# Patient Record
Sex: MALE | Race: BLACK OR AFRICAN AMERICAN | Employment: FULL TIME | URBAN - METROPOLITAN AREA
[De-identification: names, ages, dates, MRNs, and addresses within clinical notes are randomized per-mention and may not be internally consistent; named-entity substitution may affect disease eponyms.]

---

## 2017-01-18 ENCOUNTER — APPOINTMENT (OUTPATIENT)
Dept: GENERAL RADIOLOGY | Age: 32
End: 2017-01-18
Attending: NURSE PRACTITIONER
Payer: MEDICAID

## 2017-01-18 ENCOUNTER — HOSPITAL ENCOUNTER (EMERGENCY)
Age: 32
Discharge: HOME | End: 2017-01-18
Attending: EMERGENCY MEDICINE
Payer: MEDICAID

## 2017-01-18 VITALS
HEART RATE: 69 BPM | HEIGHT: 68 IN | BODY MASS INDEX: 22.73 KG/M2 | WEIGHT: 150 LBS | DIASTOLIC BLOOD PRESSURE: 67 MMHG | OXYGEN SATURATION: 98 % | RESPIRATION RATE: 17 BRPM | TEMPERATURE: 98 F | SYSTOLIC BLOOD PRESSURE: 151 MMHG

## 2017-01-18 DIAGNOSIS — R07.9 CHEST PAIN, UNSPECIFIED TYPE: Primary | ICD-10-CM

## 2017-01-18 DIAGNOSIS — R05.9 COUGH: ICD-10-CM

## 2017-01-18 LAB
ALBUMIN SERPL BCP-MCNC: 4.6 G/DL (ref 3.5–5)
ALBUMIN/GLOB SERPL: 1.4 {RATIO} (ref 1.1–2.2)
ALP SERPL-CCNC: 62 U/L (ref 45–117)
ALT SERPL-CCNC: 39 U/L (ref 12–78)
ANION GAP BLD CALC-SCNC: 10 MMOL/L (ref 5–15)
AST SERPL W P-5'-P-CCNC: 19 U/L (ref 15–37)
BASOPHILS # BLD AUTO: 0 K/UL (ref 0–0.1)
BASOPHILS # BLD: 0 % (ref 0–1)
BILIRUB SERPL-MCNC: 0.8 MG/DL (ref 0.2–1)
BUN SERPL-MCNC: 10 MG/DL (ref 6–20)
BUN/CREAT SERPL: 11 (ref 12–20)
CALCIUM SERPL-MCNC: 9.4 MG/DL (ref 8.5–10.1)
CHLORIDE SERPL-SCNC: 105 MMOL/L (ref 97–108)
CO2 SERPL-SCNC: 26 MMOL/L (ref 21–32)
CREAT SERPL-MCNC: 0.91 MG/DL (ref 0.7–1.3)
D DIMER PPP FEU-MCNC: <0.17 MG/L FEU (ref 0–0.65)
DIFFERENTIAL METHOD BLD: ABNORMAL
EOSINOPHIL # BLD: 0.1 K/UL (ref 0–0.4)
EOSINOPHIL NFR BLD: 1 % (ref 0–7)
ERYTHROCYTE [DISTWIDTH] IN BLOOD BY AUTOMATED COUNT: 15.1 % (ref 11.5–14.5)
GLOBULIN SER CALC-MCNC: 3.3 G/DL (ref 2–4)
GLUCOSE SERPL-MCNC: 98 MG/DL (ref 65–100)
HCT VFR BLD AUTO: 42.1 % (ref 36.6–50.3)
HGB BLD-MCNC: 14.2 G/DL (ref 12.1–17)
LYMPHOCYTES # BLD AUTO: 29 % (ref 12–49)
LYMPHOCYTES # BLD: 1.6 K/UL (ref 0.8–3.5)
MCH RBC QN AUTO: 25 PG (ref 26–34)
MCHC RBC AUTO-ENTMCNC: 33.7 G/DL (ref 30–36.5)
MCV RBC AUTO: 74.3 FL (ref 80–99)
MONOCYTES # BLD: 0.4 K/UL (ref 0–1)
MONOCYTES NFR BLD AUTO: 8 % (ref 5–13)
NEUTS SEG # BLD: 3.5 K/UL (ref 1.8–8)
NEUTS SEG NFR BLD AUTO: 62 % (ref 32–75)
PLATELET # BLD AUTO: 173 K/UL (ref 150–400)
PLATELET COMMENTS,PCOM: ABNORMAL
POTASSIUM SERPL-SCNC: 3.6 MMOL/L (ref 3.5–5.1)
PROT SERPL-MCNC: 7.9 G/DL (ref 6.4–8.2)
RBC # BLD AUTO: 5.67 M/UL (ref 4.1–5.7)
RBC MORPH BLD: ABNORMAL
SODIUM SERPL-SCNC: 141 MMOL/L (ref 136–145)
TROPONIN I SERPL-MCNC: <0.04 NG/ML
WBC # BLD AUTO: 5.6 K/UL (ref 4.1–11.1)
WBC MORPH BLD: ABNORMAL

## 2017-01-18 PROCEDURE — 93005 ELECTROCARDIOGRAM TRACING: CPT

## 2017-01-18 PROCEDURE — 74011250637 HC RX REV CODE- 250/637: Performed by: NURSE PRACTITIONER

## 2017-01-18 PROCEDURE — 96374 THER/PROPH/DIAG INJ IV PUSH: CPT

## 2017-01-18 PROCEDURE — 74011250636 HC RX REV CODE- 250/636: Performed by: NURSE PRACTITIONER

## 2017-01-18 PROCEDURE — 36415 COLL VENOUS BLD VENIPUNCTURE: CPT | Performed by: EMERGENCY MEDICINE

## 2017-01-18 PROCEDURE — 84484 ASSAY OF TROPONIN QUANT: CPT | Performed by: NURSE PRACTITIONER

## 2017-01-18 PROCEDURE — 74011636637 HC RX REV CODE- 636/637: Performed by: NURSE PRACTITIONER

## 2017-01-18 PROCEDURE — 85379 FIBRIN DEGRADATION QUANT: CPT | Performed by: EMERGENCY MEDICINE

## 2017-01-18 PROCEDURE — 85025 COMPLETE CBC W/AUTO DIFF WBC: CPT | Performed by: EMERGENCY MEDICINE

## 2017-01-18 PROCEDURE — 94640 AIRWAY INHALATION TREATMENT: CPT

## 2017-01-18 PROCEDURE — 71020 XR CHEST PA LAT: CPT

## 2017-01-18 PROCEDURE — 77030029684 HC NEB SM VOL KT MONA -A

## 2017-01-18 PROCEDURE — 74011000250 HC RX REV CODE- 250: Performed by: NURSE PRACTITIONER

## 2017-01-18 PROCEDURE — 80053 COMPREHEN METABOLIC PANEL: CPT | Performed by: EMERGENCY MEDICINE

## 2017-01-18 PROCEDURE — 99285 EMERGENCY DEPT VISIT HI MDM: CPT

## 2017-01-18 RX ORDER — IPRATROPIUM BROMIDE AND ALBUTEROL SULFATE 2.5; .5 MG/3ML; MG/3ML
3 SOLUTION RESPIRATORY (INHALATION)
Status: COMPLETED | OUTPATIENT
Start: 2017-01-18 | End: 2017-01-18

## 2017-01-18 RX ORDER — ALBUTEROL SULFATE 90 UG/1
2 AEROSOL, METERED RESPIRATORY (INHALATION)
Qty: 1 INHALER | Refills: 0 | Status: SHIPPED | OUTPATIENT
Start: 2017-01-18 | End: 2017-11-09

## 2017-01-18 RX ORDER — PREDNISONE 20 MG/1
40 TABLET ORAL DAILY
Qty: 8 TAB | Refills: 0 | Status: SHIPPED | OUTPATIENT
Start: 2017-01-18 | End: 2017-01-22

## 2017-01-18 RX ORDER — KETOROLAC TROMETHAMINE 30 MG/ML
30 INJECTION, SOLUTION INTRAMUSCULAR; INTRAVENOUS
Status: COMPLETED | OUTPATIENT
Start: 2017-01-18 | End: 2017-01-18

## 2017-01-18 RX ORDER — PREDNISONE 20 MG/1
60 TABLET ORAL
Status: COMPLETED | OUTPATIENT
Start: 2017-01-18 | End: 2017-01-18

## 2017-01-18 RX ORDER — BENZONATATE 200 MG/1
200 CAPSULE ORAL
Qty: 15 CAP | Refills: 0 | Status: SHIPPED | OUTPATIENT
Start: 2017-01-18 | End: 2017-01-25

## 2017-01-18 RX ORDER — LORAZEPAM 1 MG/1
1 TABLET ORAL
Status: COMPLETED | OUTPATIENT
Start: 2017-01-18 | End: 2017-01-18

## 2017-01-18 RX ORDER — LEVETIRACETAM 500 MG/1
500 TABLET ORAL 2 TIMES DAILY
COMMUNITY
End: 2017-04-26 | Stop reason: SDUPTHER

## 2017-01-18 RX ADMIN — IPRATROPIUM BROMIDE AND ALBUTEROL SULFATE 3 ML: .5; 3 SOLUTION RESPIRATORY (INHALATION) at 12:56

## 2017-01-18 RX ADMIN — PREDNISONE 60 MG: 20 TABLET ORAL at 12:59

## 2017-01-18 RX ADMIN — KETOROLAC TROMETHAMINE 30 MG: 30 INJECTION, SOLUTION INTRAMUSCULAR at 13:56

## 2017-01-18 RX ADMIN — LORAZEPAM 1 MG: 1 TABLET ORAL at 13:56

## 2017-01-18 NOTE — DISCHARGE INSTRUCTIONS
Bronchitis: Care Instructions  Your Care Instructions    Bronchitis is inflammation of the bronchial tubes, which carry air to the lungs. The tubes swell and produce mucus, or phlegm. The mucus and inflamed bronchial tubes make you cough. You may have trouble breathing. Most cases of bronchitis are caused by viruses like those that cause colds. Antibiotics usually do not help and they may be harmful. Bronchitis usually develops rapidly and lasts about 2 to 3 weeks in otherwise healthy people. Follow-up care is a key part of your treatment and safety. Be sure to make and go to all appointments, and call your doctor if you are having problems. It's also a good idea to know your test results and keep a list of the medicines you take. How can you care for yourself at home? · Take all medicines exactly as prescribed. Call your doctor if you think you are having a problem with your medicine. · Get some extra rest.  · Take an over-the-counter pain medicine, such as acetaminophen (Tylenol), ibuprofen (Advil, Motrin), or naproxen (Aleve) to reduce fever and relieve body aches. Read and follow all instructions on the label. · Do not take two or more pain medicines at the same time unless the doctor told you to. Many pain medicines have acetaminophen, which is Tylenol. Too much acetaminophen (Tylenol) can be harmful. · Take an over-the-counter cough medicine that contains dextromethorphan to help quiet a dry, hacking cough so that you can sleep. Avoid cough medicines that have more than one active ingredient. Read and follow all instructions on the label. · Breathe moist air from a humidifier, hot shower, or sink filled with hot water. The heat and moisture will thin mucus so you can cough it out. · Do not smoke. Smoking can make bronchitis worse. If you need help quitting, talk to your doctor about stop-smoking programs and medicines. These can increase your chances of quitting for good.   When should you call for help? Call 911 anytime you think you may need emergency care. For example, call if:  · You have severe trouble breathing. Call your doctor now or seek immediate medical care if:  · You have new or worse trouble breathing. · You cough up dark brown or bloody mucus (sputum). · You have a new or higher fever. · You have a new rash. Watch closely for changes in your health, and be sure to contact your doctor if:  · You cough more deeply or more often, especially if you notice more mucus or a change in the color of your mucus. · You are not getting better as expected. Where can you learn more? Go to http://emilia-johnny.info/. Enter H333 in the search box to learn more about \"Bronchitis: Care Instructions. \"  Current as of: May 23, 2016  Content Version: 11.1  © 5305-5027 [employer]. Care instructions adapted under license by [employer] (which disclaims liability or warranty for this information). If you have questions about a medical condition or this instruction, always ask your healthcare professional. [place] [address] any warranty or liability for your use of this information. Cough: Care Instructions  Your Care Instructions  A cough is your body's response to something that bothers your throat or airways. Many things can cause a cough. You might cough because of a cold or the flu, bronchitis, or asthma. Smoking, postnasal drip, allergies, and stomach acid that backs up into your throat also can cause coughs. A cough is a symptom, not a disease. Most coughs stop when the cause, such as a cold, goes away. You can take a few steps at home to cough less and feel better. Follow-up care is a key part of your treatment and safety. Be sure to make and go to all appointments, and call your doctor if you are having problems. It's also a good idea to know your test results and keep a list of the medicines you take.   How can you care for yourself at home? · Drink lots of water and other fluids. This helps thin the mucus and soothes a dry or sore throat. Honey or lemon juice in hot water or tea may ease a dry cough. · Take cough medicine as directed by your doctor. · Prop up your head on pillows to help you breathe and ease a dry cough. · Try cough drops to soothe a dry or sore throat. Cough drops don't stop a cough. Medicine-flavored cough drops are no better than candy-flavored drops or hard candy. · Do not smoke. Avoid secondhand smoke. If you need help quitting, talk to your doctor about stop-smoking programs and medicines. These can increase your chances of quitting for good. When should you call for help? Call 911 anytime you think you may need emergency care. For example, call if:  · You have severe trouble breathing. Call your doctor now or seek immediate medical care if:  · You cough up blood. · You have new or worse trouble breathing. · You have a new or higher fever. · You have a new rash. Watch closely for changes in your health, and be sure to contact your doctor if:  · You cough more deeply or more often, especially if you notice more mucus or a change in the color of your mucus. · You have new symptoms, such as a sore throat, an earache, or sinus pain. · You do not get better as expected. Where can you learn more? Go to http://emilia-johnny.info/. Enter D279 in the search box to learn more about \"Cough: Care Instructions. \"  Current as of: May 27, 2016  Content Version: 11.1  © 0561-4814 [employer]. Care instructions adapted under license by [employer] (which disclaims liability or warranty for this information). If you have questions about a medical condition or this instruction, always ask your healthcare professional. [address] any warranty or liability for your use of this information.          Chest Pain: Care Instructions  Your Care Instructions  There are many things that can cause chest pain. Some are not serious and will get better on their own in a few days. But some kinds of chest pain need more testing and treatment. Your doctor may have recommended a follow-up visit in the next 8 to 12 hours. If you are not getting better, you may need more tests or treatment. Even though your doctor has released you, you still need to watch for any problems. The doctor carefully checked you, but sometimes problems can develop later. If you have new symptoms or if your symptoms do not get better, get medical care right away. If you have worse or different chest pain or pressure that lasts more than 5 minutes or you passed out (lost consciousness), call 911 or seek other emergency help right away. A medical visit is only one step in your treatment. Even if you feel better, you still need to do what your doctor recommends, such as going to all suggested follow-up appointments and taking medicines exactly as directed. This will help you recover and help prevent future problems. How can you care for yourself at home? · Rest until you feel better. · Take your medicine exactly as prescribed. Call your doctor if you think you are having a problem with your medicine. · Do not drive after taking a prescription pain medicine. When should you call for help? Call 911 if:  · You passed out (lost consciousness). · You have severe difficulty breathing. · You have symptoms of a heart attack. These may include:  ¨ Chest pain or pressure, or a strange feeling in your chest.  ¨ Sweating. ¨ Shortness of breath. ¨ Nausea or vomiting. ¨ Pain, pressure, or a strange feeling in your back, neck, jaw, or upper belly or in one or both shoulders or arms. ¨ Lightheadedness or sudden weakness. ¨ A fast or irregular heartbeat. After you call 911, the  may tell you to chew 1 adult-strength or 2 to 4 low-dose aspirin. Wait for an ambulance. Do not try to drive yourself.   Call your doctor today if:  · You have any trouble breathing. · Your chest pain gets worse. · You are dizzy or lightheaded, or you feel like you may faint. · You are not getting better as expected. · You are having new or different chest pain. Where can you learn more? Go to http://emilia-johnny.info/. Enter A120 in the search box to learn more about \"Chest Pain: Care Instructions. \"  Current as of: May 27, 2016  Content Version: 11.1  © 4629-1617 [employer]. Care instructions adapted under license by [employer] (which disclaims liability or warranty for this information). If you have questions about a medical condition or this instruction, always ask your healthcare professional. [address] any warranty or liability for your use of this information.

## 2017-01-18 NOTE — ED TRIAGE NOTES
Patient comes to the ER via EMS c/o sudden onset of constant L sided chest pain this morning, non radiating. Patient reports not feeling well for the last month with coughing and generalized weakness. 1 nitro given en route.

## 2017-01-18 NOTE — ED PROVIDER NOTES
HPI Comments: 28 y/o male PMHx seizures, elevated blood pressure presents to the ED via EMS with a chief complaint of chest pain. Pt states that he has been \"sick for awhile,\" noting congestion and cough x 1 month and feeling generally weak. He states onset of chest pain today around 0800 while driving to work. He states pain is to the L side of his chest. States it feels 'tight'. Rates current pain as 9/10. Was given nitro en route via EMS without significant improvement. States pain is non radiating. States he feels short of breath. States cough is non productive. Denies hemoptysis, leg swelling, fever, recent travel or trauma or surgery. Denies any recent seizure (statse last seizure months ago). States he was previously told he had high blood pressure but has never taken any medication for blood pressure. History reviewed. No pertinent surgical history. SocHx: 1ppd smoker. Occasional marijuana use. Denies other drug use. Denies alcohol use      Patient is a 27 y.o. male presenting with chest pain. Chest Pain (Angina)    Associated symptoms include cough and shortness of breath. Pertinent negatives include no abdominal pain, no fever and no vomiting. Past Medical History:   Diagnosis Date    Neurological disorder      seizures    Seizures ([facility] [address]        History reviewed. No pertinent past surgical history. History reviewed. No pertinent family history. Social History     Social History    Marital status: SINGLE     Spouse name: N/A    Number of children: N/A    Years of education: N/A     Occupational History    Not on file.      Social History Main Topics    Smoking status: Current Some Day Smoker     Packs/day: 1.00    Smokeless tobacco: Not on file    Alcohol use No    Drug use: No    Sexual activity: No     Other Topics Concern    Not on file     Social History Narrative    ** Merged History Encounter **              ALLERGIES: Aspirin and Aspirin    Review of Systems Constitutional: Negative for fever. Respiratory: Positive for cough and shortness of breath. Cardiovascular: Positive for chest pain. Negative for leg swelling. Gastrointestinal: Negative for abdominal pain and vomiting. Allergic/Immunologic: Negative for immunocompromised state. 10 systems reviewed and are negative except as indicated in the HPI    Vitals:    01/18/17 1228   BP: 125/84   Pulse: 74   Resp: 18   Temp: 98 °F (36.7 °C)   SpO2: 99%   Weight: 68 kg (150 lb)   Height: 5' 8\" (1.727 m)            Physical Exam   Constitutional: He is oriented to person, place, and time. He appears well-developed and well-nourished. No distress. HENT:   Head: Normocephalic and atraumatic. Eyes: Conjunctivae are normal. Right eye exhibits no discharge. Left eye exhibits no discharge. Neck: Normal range of motion. Neck supple. Cardiovascular: Normal rate, regular rhythm and normal heart sounds. Exam reveals no gallop and no friction rub. No murmur heard. Pulmonary/Chest: Effort normal. No respiratory distress. He has no wheezes. He has no rales. He exhibits no tenderness. Somewhat diminished b/l  No wheeze  No resp distress   Abdominal: Soft. He exhibits no distension. There is no tenderness. There is no rebound and no guarding. Musculoskeletal: Normal range of motion. He exhibits no edema or tenderness. No extremity edema   Neurological: He is alert and oriented to person, place, and time. Skin: Skin is warm and dry. He is not diaphoretic. Psychiatric: His behavior is normal. Judgment and thought content normal.   Nursing note and vitals reviewed. [facility]  ED Course       28 y/o male with L sided chest pain onset today at 0800. Also reports cough x 1 month and dyspnea. Vital signs are stable. EKG reviewed with Dr. Jatinder Torres, see interpretation below. Plan: CBC, CMP, Trop, CXR, reeval. With cough x 1 month, neb tx.    Dian Rabago NP      Procedures    ED EKG interpretation:  Rhythm: normal sinus rhythm; and regular . Rate (approx.): 69; Axis: normal; normal intervals; J-point elevations in anterior leads; possible retrograde P-wave in aVF. Note written by Elena Villa. July Bhakta, as dictated by Priyanka Quesada MD 12:39 PM    ---  Pt reevaluated after neb tx. Notes some improvement to pain but continues to note pain. He notes pleuritic discomfort. Lungs cta-b with improved aeration on reevaluation. HR noted to be in low 100s (<110) during reeval. No PE r/f, pt low risk, will add d-dimer. He also reports anxiety, will give Ativan. Dian Rabago NP  1:47 PM    ---     Pt reevaluated, resting comfortably in NAD and states he feels better. D-dimer is not elevated. VSS. Suspect sx d/t bronchitis, anxiety. Reassuring workup. Pt d/w ED attending Dr. Jatinder Torres. D/w patient close f/u with PCP. Plan: albuterol mdi, prednisone, tessalon for bronchitis. Dian Rabago NP        XR Results (most recent):    Results from Hospital Encounter encounter on 01/18/17   XR CHEST PA LAT   Narrative Exam:  2 view chest    Indication: Chest pain, shortness of breath, recent breathing treatment. COMPARISON: 4/24/2012    PA and lateral views demonstrate normal heart size. The patient is on a cardiac  monitor. The lungs are well aerated and clear. No adenopathy or pleural  effusions. Visualized osseous structures are unremarkable. Impression IMPRESSION:  1.  No acute process

## 2017-01-19 LAB
ATRIAL RATE: 69 BPM
CALCULATED R AXIS, ECG10: 61 DEGREES
CALCULATED T AXIS, ECG11: 18 DEGREES
DIAGNOSIS, 93000: NORMAL
P-R INTERVAL, ECG05: 170 MS
Q-T INTERVAL, ECG07: 354 MS
QRS DURATION, ECG06: 88 MS
QTC CALCULATION (BEZET), ECG08: 379 MS
VENTRICULAR RATE, ECG03: 69 BPM

## 2017-03-08 ENCOUNTER — HOSPITAL ENCOUNTER (OUTPATIENT)
Dept: LAB | Age: 32
Discharge: HOME | End: 2017-03-08

## 2017-03-08 PROCEDURE — 99001 SPECIMEN HANDLING PT-LAB: CPT | Performed by: PSYCHIATRY & NEUROLOGY

## 2017-04-26 ENCOUNTER — OFFICE VISIT (OUTPATIENT)
Dept: NEUROLOGY | Age: 32
End: 2017-04-26

## 2017-04-26 VITALS
OXYGEN SATURATION: 99 % | BODY MASS INDEX: 25.46 KG/M2 | WEIGHT: 168 LBS | RESPIRATION RATE: 14 BRPM | HEART RATE: 68 BPM | DIASTOLIC BLOOD PRESSURE: 82 MMHG | SYSTOLIC BLOOD PRESSURE: 136 MMHG | TEMPERATURE: 98.1 F | HEIGHT: 68 IN

## 2017-04-26 DIAGNOSIS — G43.709 CHRONIC MIGRAINE WITHOUT AURA WITHOUT STATUS MIGRAINOSUS, NOT INTRACTABLE: ICD-10-CM

## 2017-04-26 DIAGNOSIS — R76.8 POSITIVE ANA (ANTINUCLEAR ANTIBODY): ICD-10-CM

## 2017-04-26 DIAGNOSIS — M35.9 AUTOIMMUNE DISEASE (HCC): ICD-10-CM

## 2017-04-26 DIAGNOSIS — R20.2 PARESTHESIA: ICD-10-CM

## 2017-04-26 DIAGNOSIS — R42 DIZZINESS: ICD-10-CM

## 2017-04-26 DIAGNOSIS — G40.209 COMPLEX PARTIAL SEIZURE WITH IMPAIRMENT OF CONSCIOUSNESS AT ONSET (HCC): Primary | ICD-10-CM

## 2017-04-26 RX ORDER — TIZANIDINE 4 MG/1
TABLET ORAL
COMMUNITY
Start: 2017-04-24 | End: 2017-04-26 | Stop reason: SDUPTHER

## 2017-04-26 RX ORDER — TOPIRAMATE 100 MG/1
TABLET, FILM COATED ORAL
Refills: 1 | COMMUNITY
Start: 2017-03-22 | End: 2017-04-26 | Stop reason: ALTCHOICE

## 2017-04-26 RX ORDER — DIVALPROEX SODIUM 250 MG/1
500 TABLET, DELAYED RELEASE ORAL
Qty: 60 TAB | Refills: 1 | Status: SHIPPED | OUTPATIENT
Start: 2017-04-26 | End: 2017-05-25 | Stop reason: SDUPTHER

## 2017-04-26 RX ORDER — ERGOCALCIFEROL 1.25 MG/1
CAPSULE ORAL
COMMUNITY
Start: 2017-04-24 | End: 2017-05-25 | Stop reason: SDUPTHER

## 2017-04-26 RX ORDER — GABAPENTIN 300 MG/1
CAPSULE ORAL
Refills: 1 | COMMUNITY
Start: 2017-03-22 | End: 2017-05-25 | Stop reason: SDUPTHER

## 2017-04-26 RX ORDER — TIZANIDINE 4 MG/1
4 TABLET ORAL
Qty: 30 TAB | Refills: 2 | Status: SHIPPED | OUTPATIENT
Start: 2017-04-26 | End: 2017-05-25 | Stop reason: SDUPTHER

## 2017-04-26 RX ORDER — LEVETIRACETAM 500 MG/1
500 TABLET ORAL 2 TIMES DAILY
Qty: 60 TAB | Refills: 2 | Status: SHIPPED | OUTPATIENT
Start: 2017-04-26 | End: 2017-05-25 | Stop reason: SDUPTHER

## 2017-04-26 RX ORDER — HYDROCODONE BITARTRATE AND ACETAMINOPHEN 7.5; 325 MG/1; MG/1
1 TABLET ORAL
Qty: 30 TAB | Refills: 0 | Status: SHIPPED | OUTPATIENT
Start: 2017-04-26 | End: 2017-05-25 | Stop reason: SDUPTHER

## 2017-04-26 NOTE — PROGRESS NOTES
Neurology Progress Note    NAME:  Sheila Choi   :   1985   MRN:   [id number]     Date/Time:  2017  Subjective: Sheila Choi is a 27 y.o. male here today for follow up. Last seizure was about a month ago. Experiences periodic confusion and headache. Denies difficulty swallowing. Patient appears to have missed his medications. Headache with nausea. Review of Systems:  Per HPI - Otherwise 12 point ROS was negative     []Unable to obtain  ROS due to  []mental status change  []sedated   []intubated    Medications reviewed:  Current Outpatient Prescriptions   Medication Sig Dispense Refill    ergocalciferol (ERGOCALCIFEROL) 50,000 unit capsule       gabapentin (NEURONTIN) 300 mg capsule TAKE 1 CAPSULE BY MOUTH TWICE DAILY  1    divalproex DR (DEPAKOTE) 250 mg tablet Take 2 Tabs by mouth nightly. 60 Tab 1    HYDROcodone-acetaminophen (NORCO) 7.5-325 mg per tablet Take 1 Tab by mouth every eight (8) hours as needed for Pain. Max Daily Amount: 3 Tabs. 30 Tab 0    levETIRAcetam (KEPPRA) 500 mg tablet Take 1 Tab by mouth two (2) times a day. 60 Tab 2    tiZANidine (ZANAFLEX) 4 mg tablet Take 1 Tab by mouth nightly. 30 Tab 2    albuterol (VENTOLIN HFA) 90 mcg/actuation inhaler Take 2 Puffs by inhalation every four (4) hours as needed for Wheezing. 1 Inhaler 0        Objective:   Vitals:  Vitals:    17 1157   BP: 136/82   Pulse: 68   Resp: 14   Temp: 98.1 °F (36.7 °C)   TempSrc: Oral   SpO2: 99%   Weight: 168 lb (76.2 kg)   Height: 5' 8\" (1.727 m)   PainSc:   8   PainLoc: Leg       PHYSICAL EXAM:  General:    Alert, cooperative, no distress, appears stated age. Head:   Normocephalic, without obvious abnormality, atraumatic. Eyes:   Conjunctivae/corneas clear. PERRLA  Nose:  Nares normal. No drainage or sinus tenderness. Throat:    Lips, mucosa, and tongue normal.  No Thrush  Neck:  Supple, symmetrical,  no adenopathy, thyroid: non tender    no carotid bruit and no JVD.   Back:    Symmetric,  No CVA tenderness. Lungs:   Clear to auscultation bilaterally. No Wheezing or Rhonchi. No rales. Chest wall:  No tenderness or deformity. No Accessory muscle use. Heart:   Regular rate and rhythm,  no murmur, rub or gallop. Abdomen:   Soft, non-tender. Not distended. Bowel sounds normal. No masses  Extremities: Extremities normal, atraumatic, No cyanosis. No edema. No clubbing  Skin:     Texture, turgor normal. No rashes or lesions. Not Jaundiced  Lymph nodes: Cervical, supraclavicular normal.  Psych:  Good insight. Not depressed. Not anxious or agitated. NEUROLOGICAL EXAM:  Appearance: The patient is well developed, well nourished, provides a coherent history and is in no acute distress. Mental Status: Oriented to time, place and person. Mood and affect appropriate. Cranial Nerves:   Intact visual fields. Fundi are benign. TANG, EOM's full, no nystagmus, no ptosis. Facial sensation is normal. Corneal reflexes are intact. Facial movement is symmetric. Hearing is normal bilaterally. Palate is midline with normal sternocleidomastoid and trapezius muscles are normal. Tongue is midline. Motor:  5/5 strength in upper and lower proximal and distal muscles. Normal bulk and tone. No fasciculations. Reflexes:   Deep tendon reflexes 2+/4 and symmetrical.   Sensory:   Normal to touch, pinprick and vibration. Gait:  Normal gait. Tremor:   No tremor noted. Cerebellar:  No cerebellar signs present. Neurovascular:  Normal heart sounds and regular rhythm, peripheral pulses intact, and no carotid bruits. Lab Data Reviewed:    No visits with results within 3 Month(s) from this visit.   Latest known visit with results is:    Admission on 01/18/2017, Discharged on 01/18/2017   Component Date Value Ref Range Status    Ventricular Rate 01/18/2017 69  BPM Final    Atrial Rate 01/18/2017 69  BPM Final    P-R Interval 01/18/2017 170  ms Final    QRS Duration 01/18/2017 88  ms Final    Q-T Interval 01/18/2017 354  ms Final    QTC Calculation (Bezet) 01/18/2017 379  ms Final    Calculated R Axis 01/18/2017 61  degrees Final    Calculated T Axis 01/18/2017 18  degrees Final    Diagnosis 01/18/2017    Final                    Value:Normal sinus rhythm  When compared with ECG of 24-APR-2012 15:39,  No significant change was found  Confirmed by Mimi Habermann, M.D., Naeved Chacko ([id number]) on 1/19/2017 6:59:51 AM      WBC 01/18/2017 5.6  4.1 - 11.1 K/uL Final    RBC 01/18/2017 5.67  4.10 - 5.70 M/uL Final    HGB 01/18/2017 14.2  12.1 - 17.0 g/dL Final    HCT 01/18/2017 42.1  36.6 - 50.3 % Final    MCV 01/18/2017 74.3* 80.0 - 99.0 FL Final    MCH 01/18/2017 25.0* 26.0 - 34.0 PG Final    MCHC 01/18/2017 33.7  30.0 - 36.5 g/dL Final    RDW 01/18/2017 15.1* 11.5 - 14.5 % Final    PLATELET 29/97/1415 022  150 - 400 K/uL Final    NEUTROPHILS 01/18/2017 62  32 - 75 % Final    LYMPHOCYTES 01/18/2017 29  12 - 49 % Final    MONOCYTES 01/18/2017 8  5 - 13 % Final    EOSINOPHILS 01/18/2017 1  0 - 7 % Final    BASOPHILS 01/18/2017 0  0 - 1 % Final    ABS. NEUTROPHILS 01/18/2017 3.5  1.8 - 8.0 K/UL Final    ABS. LYMPHOCYTES 01/18/2017 1.6  0.8 - 3.5 K/UL Final    ABS. MONOCYTES 01/18/2017 0.4  0.0 - 1.0 K/UL Final    ABS. EOSINOPHILS 01/18/2017 0.1  0.0 - 0.4 K/UL Final    ABS.  BASOPHILS 01/18/2017 0.0  0.0 - 0.1 K/UL Final    DF 01/18/2017 SMEAR SCANNED    Final    PLATELET COMMENTS 29/80/0975 LARGE PLATELETS    Final    PRESENT    RBC COMMENTS 01/18/2017     Final                    Value:ANISOCYTOSIS  1+      RBC COMMENTS 01/18/2017     Final                    Value:POLYCHROMASIA  1+      RBC COMMENTS 01/18/2017     Final                    Value:MICROCYTOSIS  1+      RBC COMMENTS 01/18/2017     Final                    Value:HYPOCHROMIA  1+      RBC COMMENTS 01/18/2017     Final                    Value:BOYD CELLS  PRESENT      WBC COMMENTS 01/18/2017 REACTIVE LYMPHS    Final    PRESENT    Sodium 01/18/2017 141  136 - 145 mmol/L Final    Potassium 01/18/2017 3.6  3.5 - 5.1 mmol/L Final    Chloride 01/18/2017 105  97 - 108 mmol/L Final    CO2 01/18/2017 26  21 - 32 mmol/L Final    Anion gap 01/18/2017 10  5 - 15 mmol/L Final    Glucose 01/18/2017 98  65 - 100 mg/dL Final    BUN 01/18/2017 10  6 - 20 MG/DL Final    Creatinine 01/18/2017 0.91  0.70 - 1.30 MG/DL Final    BUN/Creatinine ratio 01/18/2017 11* 12 - 20   Final    GFR est AA 01/18/2017 >60  >60 ml/min/1.73m2 Final    GFR est non-AA 01/18/2017 >60  >60 ml/min/1.73m2 Final    Comment: Estimated GFR is calculated using the IDMS-traceable Modification of Diet in Renal Disease (MDRD) Study equation, reported for both  Americans (GFRAA) and non- Americans (GFRNA), and normalized to 1.73m2 body surface area. The physician must decide which value applies to the patient. The MDRD study equation should only be used in individuals age 25 or older. It has not been validated for the following: pregnant women, patients with serious comorbid conditions, or on certain medications, or persons with extremes of body size, muscle mass, or nutritional status.  Calcium 01/18/2017 9.4  8.5 - 10.1 MG/DL Final    Bilirubin, total 01/18/2017 0.8  0.2 - 1.0 MG/DL Final    ALT (SGPT) 01/18/2017 39  12 - 78 U/L Final    AST (SGOT) 01/18/2017 19  15 - 37 U/L Final    Alk. phosphatase 01/18/2017 62  45 - 117 U/L Final    Protein, total 01/18/2017 7.9  6.4 - 8.2 g/dL Final    Albumin 01/18/2017 4.6  3.5 - 5.0 g/dL Final    Globulin 01/18/2017 3.3  2.0 - 4.0 g/dL Final    A-G Ratio 01/18/2017 1.4  1.1 - 2.2   Final    Troponin-I, Qt. 01/18/2017 <0.04  <0.05 ng/mL Final    Comment: The presence of detectable troponin above the reference range indicates myocardial injury which may be due to ischemia, myocarditis, trauma, etc.  Clinical correlation is necessary to establish the significance of this finding.   Sequential testing is recommended to determine if the typical rise and fall of cTnI is demonstrated. Note:  Cardiac troponin I has a relatively long half life and may be present well after the CK MB has returned to baseline. The reference range is based on the 99th percentile of the referent population.  D-dimer 01/18/2017 <0.17  0.00 - 0.65 mg/L FEU Final    Comment: (NOTE)  The combination of a low pre-test probability based on Wells criteria  and a D-Dimer result below the cutoff value of 0.5 mg/L increases the   negative predictive value for DVT to %. CT Results (recent):    Results from Hospital Encounter encounter on 04/24/12   CT HEAD WO CONT   Narrative **Final Report**      ICD Codes / Adm. Diagnosis: 52  499173 / Headache  Generalized Body Aches  Examination:  CT HEAD WO CON  - [id number] - Apr 24 2012  4:17PM  Accession No:  [id number]  Reason:  headache, seizure      REPORT:  Indication: Headache, seizure    Comparison to 10/13/2011    Multiple axial images were obtained from the skull base to the vertex   without the use of intravenous contrast. Ventricles and sulci are normal for   patient's age. There is no midline shift or herniation. The examination is   negative for acute infarct, mass lesion, or hemorrhage. The visualized   portions of the petrous temporal bones, paranasal sinuses, and orbits are   unremarkable. IMPRESSION: No acute process. Signing/Reading Doctor: Winsome Robison ([id number])    Approved: Winsome Robison ([id number])  04/24/2012                                      MRI Results (recent):    Results from [place] encounter on 07/25/16   MRI BRAIN W WO CONT   Narrative **Final Report**      ICD Codes / Adm. Diagnosis: 341.9  780.39 / Demyelinating disease of centr    Other convulsions  Ex[facility]  [facility]  - [id number] - Jul 25 2016  3:07PM  Accession No:  [id number]  Reason:  Seizures      REPORT:  EXAM:  MR BRAIN W AND WO CON  INDICATION:  Seizures, demyelinating disease, headaches. TECHNIQUE: Sagittal T1, axial FLAIR, T2, T1 and gradient echo T2-weighted   images of the head were obtained followed by intravenous infusion 6 mL   Gadavist repeat axial and coronal T1-weighted images and axial diffusion   weighted images. Thin angled coronal T2 and FLAIR images through the   temporal lobes. Sagittal FLAIR image of the whole head. COMPARISON: None available. FINDINGS:  The ventricular size and configuration are normal. Incidental note of a   small midline 1 cm cyst posterior to the third ventricle but appears   somewhat separate but abutting the superior pineal gland. 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 This should be of no   clinical significance as it is causing no significant mass effect and should   be developmental in origin. Normal signal demonstrated in the cerebral   hemispheres, brain stem and cerebellum. No abnormal areas of intracranial enhancement. No abnormal diffusion. No evidence of intracranial hemorrhage, acute infarct, mass or abnormal   extra-axial fluid collections. Normal flow-voids are present in the vertebral, basilar and carotid artery   systems. Note is made of diminished T1 hyperintensity in the upper cervical spine   marrow signal. This may be related to red marrow replacement. The structures of the cranial base including paranasal sinuses are otherwise   unremarkable. IMPRESSION:   1. Diminished marrow signal in the upper cervical spine may be within normal   variation due to red marrow replacement. Correlate clinically. 2. Otherwise essentially normal MRI of the head. Signing/Reading Doctor: Reilly Robison ([id number])    Approved: Reilly Robison ([id number])  Jul 25 2016  5:11PM                                   IR Results (recent):  No results found for this or any previous visit.     VAS/US Results (recent):  No results found for this or any previous visit. Assesment  There is no problem list on file for this patient.     ___________________________________________________  PLAN:      ICD-10-CM ICD-9-CM    1. Complex partial seizure with impairment of consciousness at onset [facility]) G40.201 345.40    2. Chronic migraine without aura without status migrainosus, not intractable G43.709 346.70    3. Paresthesia R20.2 782.0    4. Dizziness R42 780.4    5. Autoimmune disease ([address] M35.9 279.49 LUPUS ANTICOAGULANT COMP PANEL   6. Positive WARREN (antinuclear antibody) R76.8 795.79 LUPUS ANTICOAGULANT COMP PANEL     Follow-up Disposition:  Return in about 3 months (around 7/26/2017).          ___________________________________________________    Total time spent with patient:  []15   []25   []35   [] __ minutes    Care Plan discussed with:    []Patient   []Family    []Care Manager   []Consultant/Specialist :    ___________________________________________________    Attending Physician: Roque Segal MD

## 2017-04-26 NOTE — MR AVS SNAPSHOT
Visit Information Date & Time Provider Department Dept. Phone Encounter #  
 4/26/2017 11:40 AM Meliton Lewis MD [facility] at [facility] [phone] [id number] Follow-up Instructions Return in about 3 months (around 7/26/2017). Upcoming Health Maintenance Date Due Pneumococcal 19-64 Medium Risk (1 of 1 - PPSV23) 8/20/2003 DTaP/Tdap/Td series (1 - Tdap) 6/20/2011 INFLUENZA AGE 9 TO ADULT 8/1/2016 Allergies as of 4/26/2017  Review Complete On: 4/26/2017 By: Jon Murphy MD  
  
 Severity Noted Reaction Type Reactions Aspirin  06/19/2011    Hives Aspirin  10/13/2011   Intolerance Anaphylaxis Current Immunizations  Never Reviewed Name Date  
 TD Vaccine 6/19/2011 11:16 PM  
  
 Not reviewed this visit You Were Diagnosed With   
  
 Codes Comments Complex partial seizure with impairment of consciousness at onset [facility])    -  Primary ICD-10-CM: G40.201 ICD-9-CM: 345.40 Chronic migraine without aura without status migrainosus, not intractable     ICD-10-CM: J94.503 ICD-9-CM: 346.70 Paresthesia     ICD-10-CM: R20.2 ICD-9-CM: 782.0 Dizziness     ICD-10-CM: M26 ICD-9-CM: 780.4 Vitals BP Pulse Temp Resp Height(growth percentile) Weight(growth percentile) 136/82 (BP 1 Location: Left arm, BP Patient Position: Sitting) 68 98.1 °F (36.7 °C) (Oral) 14 5' 8\" (1.727 m) 168 lb (76.2 kg) SpO2 BMI Smoking Status 99% 25.54 kg/m2 Current Some Day Smoker Vitals History BMI and BSA Data Body Mass Index Body Surface Area 25.54 kg/m 2 1.91 m 2 Preferred Pharmacy Pharmacy Name Phone [facility] [address] [place] [address] [phone] Your Updated Medication List  
  
   
This list is accurate as of: 4/26/17 12:23 PM.  Always use your most recent med list.  
  
  
  
  
 albuterol 90 mcg/actuation inhaler Commonly known as:  VENTOLIN HFA Take 2 Puffs by inhalation every four (4) hours as needed for Wheezing. divalproex  mg tablet Commonly known as:  DEPAKOTE Take 2 Tabs by mouth nightly.  
  
 ergocalciferol 50,000 unit capsule Commonly known as:  ERGOCALCIFEROL  
  
 gabapentin 300 mg capsule Commonly known as:  NEURONTIN  
TAKE 1 CAPSULE BY MOUTH TWICE DAILY HYDROcodone-acetaminophen 7.5-325 mg per tablet Commonly known as:  Karole Dakins Take 1 Tab by mouth every eight (8) hours as needed for Pain. Max Daily Amount: 3 Tabs. levETIRAcetam 500 mg tablet Commonly known as:  KEPPRA Take 1 Tab by mouth two (2) times a day. tiZANidine 4 mg tablet Commonly known as:  Fabiano Schwalbe Take 1 Tab by mouth nightly. Prescriptions Printed Refills HYDROcodone-acetaminophen (NORCO) 7.5-325 mg per tablet 0 Sig: Take 1 Tab by mouth every eight (8) hours as needed for Pain. Max Daily Amount: 3 Tabs. Class: Print Route: Oral  
  
Prescriptions Sent to Pharmacy Refills  
 divalproex DR (DEPAKOTE) 250 mg tablet 1 Sig: Take 2 Tabs by mouth nightly. Class: Normal  
 Pharmacy: [address] [place] [address] Ph #: [phone] Route: Oral  
 levETIRAcetam (KEPPRA) 500 mg tablet 2 Sig: Take 1 Tab by mouth two (2) times a day. Class: Normal  
 Pharmacy: [address] [place] [address] Ph #: [phone] Route: Oral  
 tiZANidine (ZANAFLEX) 4 mg tablet 2 Sig: Take 1 Tab by mouth nightly. Class: Normal  
 Pharmacy: [address] [place] [address] Ph #: [phone] Route: Oral  
  
Follow-up Instructions Return in about 3 months (around 7/26/2017). Introducing [facility]! [facility] introduces [employer] patient portal. Now you can access parts of your medical record, email your doctor's office, and request medication refills online. 1. In your internet browser, go to [email or web address] 2. Click on the First Time User? Click Here link in the Sign In box. You will see the New Member Sign Up page. 3. Enter your [employer] Access Code exactly as it appears below. You will not need to use this code after youve completed the sign-up process. If you do not sign up before the expiration date, you must request a new code. · [employer] Access Code: [email or web address] Expires: 7/25/2017 12:05 PM 
 
4. Enter the last four digits of your Social Security Number (xxxx) and Date of Birth (mm/dd/yyyy) as indicated and click Submit. You will be taken to the next sign-up page. 5. Create a Bridestoryt ID. This will be your [employer] login ID and cannot be changed, so think of one that is secure and easy to remember. 6. Create a [employer] password. You can change your password at any time. 7. Enter your Password Reset Question and Answer. This can be used at a later time if you forget your password. 8. Enter your e-mail address. You will receive e-mail notification when new information is available in [address] 9. Click Sign Up. You can now view and download portions of your medical record. 10. Click the Download Summary menu link to download a portable copy of your medical information. If you have questions, please visit the Frequently Asked Questions section of the [employer] website. Remember, [employer] is NOT to be used for urgent needs. For medical emergencies, dial 911. Now available from your iPhone and Android! Please provide this summary of care documentation to your next provider. Your primary care clinician is listed as Omayra Molina If you have any questions after today's visit, please call [phone].

## 2017-04-26 NOTE — PROGRESS NOTES
1. Have you been to the ER, urgent care clinic since your last visit? Hospitalized since your last visit? No    2. Have you seen or consulted any other health care providers outside of the [address] [place] [address] since your last visit? Include any pap smears or colon screening.  No       Chief Complaint   Patient presents with    Seizure     follow up    Labs     follow up on lab results     fasting

## 2017-05-23 ENCOUNTER — HOSPITAL ENCOUNTER (OUTPATIENT)
Dept: LAB | Age: 32
Discharge: HOME | End: 2017-05-23

## 2017-05-23 PROCEDURE — 99001 SPECIMEN HANDLING PT-LAB: CPT | Performed by: PSYCHIATRY & NEUROLOGY

## 2017-05-25 ENCOUNTER — OFFICE VISIT (OUTPATIENT)
Dept: NEUROLOGY | Age: 32
End: 2017-05-25

## 2017-05-25 VITALS
SYSTOLIC BLOOD PRESSURE: 137 MMHG | OXYGEN SATURATION: 97 % | DIASTOLIC BLOOD PRESSURE: 75 MMHG | HEIGHT: 68 IN | TEMPERATURE: 99.2 F | HEART RATE: 70 BPM | BODY MASS INDEX: 25.28 KG/M2 | RESPIRATION RATE: 16 BRPM | WEIGHT: 166.8 LBS

## 2017-05-25 DIAGNOSIS — M79.604 PAIN IN BOTH LOWER EXTREMITIES: ICD-10-CM

## 2017-05-25 DIAGNOSIS — G43.009 MIGRAINE WITHOUT AURA AND WITHOUT STATUS MIGRAINOSUS, NOT INTRACTABLE: ICD-10-CM

## 2017-05-25 DIAGNOSIS — J06.9 UPPER RESPIRATORY TRACT INFECTION, UNSPECIFIED TYPE: ICD-10-CM

## 2017-05-25 DIAGNOSIS — M79.605 PAIN IN BOTH LOWER EXTREMITIES: ICD-10-CM

## 2017-05-25 DIAGNOSIS — R20.2 PARESTHESIA: ICD-10-CM

## 2017-05-25 DIAGNOSIS — G40.209 PARTIAL SYMPTOMATIC EPILEPSY WITH COMPLEX PARTIAL SEIZURES, NOT INTRACTABLE, WITHOUT STATUS EPILEPTICUS (HCC): Primary | ICD-10-CM

## 2017-05-25 DIAGNOSIS — M79.18 MYOFASCIAL MUSCLE PAIN: ICD-10-CM

## 2017-05-25 LAB
LA NT DPL PPP: 37 SEC (ref 0–55)
LA NT DPL/LA NT HPL PPP-RTO: 0.92 RATIO (ref 0–1.4)
LA PPP-IMP: NORMAL
SCREEN APTT: 35.3 SEC (ref 0–43.6)
SCREEN DRVVT: 34.2 SEC (ref 0–47)
THROMBIN TIME: 16 SEC (ref 0–20.9)

## 2017-05-25 RX ORDER — TIZANIDINE 4 MG/1
4 TABLET ORAL
Qty: 30 TAB | Refills: 2 | Status: SHIPPED | OUTPATIENT
Start: 2017-05-25 | End: 2018-03-12

## 2017-05-25 RX ORDER — GABAPENTIN 300 MG/1
300 CAPSULE ORAL 3 TIMES DAILY
Qty: 90 CAP | Refills: 1 | Status: SHIPPED | OUTPATIENT
Start: 2017-05-25 | End: 2018-03-12

## 2017-05-25 RX ORDER — AMOXICILLIN 500 MG/1
500 CAPSULE ORAL 3 TIMES DAILY
Qty: 21 CAP | Refills: 1 | Status: SHIPPED | OUTPATIENT
Start: 2017-05-25 | End: 2017-11-09

## 2017-05-25 RX ORDER — DIVALPROEX SODIUM 250 MG/1
500 TABLET, DELAYED RELEASE ORAL
Qty: 60 TAB | Refills: 1 | Status: SHIPPED | OUTPATIENT
Start: 2017-05-25 | End: 2017-08-28 | Stop reason: SDUPTHER

## 2017-05-25 RX ORDER — ERGOCALCIFEROL 1.25 MG/1
50000 CAPSULE ORAL
Qty: 4 CAP | Refills: 3 | Status: SHIPPED | OUTPATIENT
Start: 2017-05-25 | End: 2018-03-12

## 2017-05-25 RX ORDER — HYDROCODONE BITARTRATE AND ACETAMINOPHEN 10; 325 MG/1; MG/1
1 TABLET ORAL
Qty: 40 TAB | Refills: 0 | Status: SHIPPED | OUTPATIENT
Start: 2017-05-25 | End: 2017-11-09

## 2017-05-25 RX ORDER — HYDROCODONE BITARTRATE AND ACETAMINOPHEN 10; 325 MG/1; MG/1
TABLET ORAL
Refills: 0 | COMMUNITY
Start: 2017-02-17 | End: 2017-05-25 | Stop reason: SDUPTHER

## 2017-05-25 RX ORDER — LEVETIRACETAM 500 MG/1
500 TABLET ORAL 2 TIMES DAILY
Qty: 60 TAB | Refills: 2 | Status: SHIPPED | OUTPATIENT
Start: 2017-05-25 | End: 2017-10-11 | Stop reason: SDUPTHER

## 2017-05-25 RX ORDER — PREDNISONE 10 MG/1
TABLET ORAL
Refills: 0 | COMMUNITY
Start: 2017-02-17 | End: 2017-05-25 | Stop reason: ALTCHOICE

## 2017-05-25 NOTE — MR AVS SNAPSHOT
Visit Information Date & Time Provider Department Dept. Phone Encounter #  
 5/25/2017 11:00 AM MD [facility] at [address] [place] [address] [id number] Follow-up Instructions Return in about 3 months (around 8/25/2017). Your Appointments 7/27/2017 11:40 AM  
Follow Up with MD [facility] at [facility]) Appt Note: follow up, est, 3 months, seizure pt  
 [address] [facility] Upcoming Health Maintenance Date Due Pneumococcal 19-64 Medium Risk (1 of 1 - PPSV23) 8/20/2003 DTaP/Tdap/Td series (1 - Tdap) 6/20/2011 INFLUENZA AGE 9 TO ADULT 8/1/2017 Allergies as of 5/25/2017  Review Complete On: 5/25/2017 By: Tank Miranda LPN Severity Noted Reaction Type Reactions Aspirin  06/19/2011    Hives Aspirin  10/13/2011   Intolerance Anaphylaxis Current Immunizations  Never Reviewed Name Date  
 TD Vaccine 6/19/2011 11:16 PM  
  
 Not reviewed this visit You Were Diagnosed With   
  
 Codes Comments Partial symptomatic epilepsy with complex partial seizures, not intractable, without status epilepticus ([facility] [address]    -  Primary ICD-10-CM: C10.825 ICD-9-CM: 345.40 Paresthesia     ICD-10-CM: R20.2 ICD-9-CM: 782.0 Migraine without aura and without status migrainosus, not intractable     ICD-10-CM: Y67.896 ICD-9-CM: 346.10 Pain in both lower extremities     ICD-10-CM: M79.604, M79.605 ICD-9-CM: 729.5 Myofascial muscle pain     ICD-10-CM: M79.1 ICD-9-CM: 729.1 Upper respiratory tract infection, unspecified type     ICD-10-CM: J06.9 ICD-9-CM: 465.9 Vitals  BP Pulse Temp Resp Height(growth percentile)  
 137/75 (BP 1 Location: Right arm, BP Patient Position: Sitting) 70 99.2 °F (37.3 °C) (Temporal) 16 5' 8\" (1.727 m) Weight(growth percentile) SpO2 BMI Smoking Status 166 lb 12.8 oz (75.7 kg) 97% 25.36 kg/m2 Current Some Day Smoker BMI and BSA Data Body Mass Index Body Surface Area  
 25.36 kg/m 2 1.91 m 2 Preferred Pharmacy Pharmacy Name Phone [facility] [address] [phone] Your Updated Medication List  
  
   
This list is accurate as of: 5/25/17 12:52 PM.  Always use your most recent med list.  
  
  
  
  
 albuterol 90 mcg/actuation inhaler Commonly known as:  VENTOLIN HFA Take 2 Puffs by inhalation every four (4) hours as needed for Wheezing. amoxicillin 500 mg capsule Commonly known as:  AMOXIL Take 1 Cap by mouth three (3) times daily. divalproex  mg tablet Commonly known as:  DEPAKOTE Take 2 Tabs by mouth nightly.  
  
 ergocalciferol 50,000 unit capsule Commonly known as:  ERGOCALCIFEROL Take 1 Cap by mouth every seven (7) days. gabapentin 300 mg capsule Commonly known as:  NEURONTIN Take 1 Cap by mouth three (3) times daily. HYDROcodone-acetaminophen  mg tablet Commonly known as:  Elyn Barley Take 1 Tab by mouth every eight (8) hours as needed for Pain. Max Daily Amount: 3 Tabs. levETIRAcetam 500 mg tablet Commonly known as:  KEPPRA Take 1 Tab by mouth two (2) times a day. tiZANidine 4 mg tablet Commonly known as:  Claudia Hides Take 1 Tab by mouth nightly. Prescriptions Printed Refills HYDROcodone-acetaminophen (NORCO)  mg tablet 0 Sig: Take 1 Tab by mouth every eight (8) hours as needed for Pain. Max Daily Amount: 3 Tabs. Class: Print Route: Oral  
  
Prescriptions Sent to Pharmacy Refills  
 ergocalciferol (ERGOCALCIFEROL) 50,000 unit capsule 3 Sig: Take 1 Cap by mouth every seven (7) days.   
 Class: Normal  
 Pharmacy: [address], [address] [place] [address] #: [phone] Route: Oral  
 divalproex DR (DEPAKOTE) 250 mg tablet 1 Sig: Take 2 Tabs by mouth nightly. Class: Normal  
 Pharmacy: [address] [place] [address] Ph #: [phone] Route: Oral  
 levETIRAcetam (KEPPRA) 500 mg tablet 2 Sig: Take 1 Tab by mouth two (2) times a day. Class: Normal  
 Pharmacy: [address] [place] [address] Ph #: [phone] Route: Oral  
 tiZANidine (ZANAFLEX) 4 mg tablet 2 Sig: Take 1 Tab by mouth nightly. Class: Normal  
 Pharmacy: [address] [place] [address] Ph #: [phone] Route: Oral  
 gabapentin (NEURONTIN) 300 mg capsule 1 Sig: Take 1 Cap by mouth three (3) times daily. Class: Normal  
 Pharmacy: [address] [place] [address] Ph #: [phone] Route: Oral  
 amoxicillin (AMOXIL) 500 mg capsule 1 Sig: Take 1 Cap by mouth three (3) times daily. Class: Normal  
 Pharmacy: [address] [place] [address] #: [phone] Route: Oral  
  
Follow-up Instructions Return in about 3 months (around 8/25/2017). Introducing [facility]! [employer] introduces sellpoints patient portal. Now you can access parts of your medical record, email your doctor's office, and request medication refills online. 1. In your internet browser, go to [email or web address] 2. Click on the First Time User? Click Here link in the Sign In box. You will see the New Member Sign Up page. 3. Enter your sellpoints Access Code exactly as it appears below. You will not need to use this code after youve completed the sign-up process. If you do not sign up before the expiration date, you must request a new code. · sellpoints Access Code: [email or web address] Expires: 7/25/2017 12:05 PM 
 
 4. Enter the last four digits of your Social Security Number (xxxx) and Date of Birth (mm/dd/yyyy) as indicated and click Submit. You will be taken to the next sign-up page. 5. Create a Dynasil ID. This will be your Dynasil login ID and cannot be changed, so think of one that is secure and easy to remember. 6. Create a Dynasil password. You can change your password at any time. 7. Enter your Password Reset Question and Answer. This can be used at a later time if you forget your password. 8. Enter your e-mail address. You will receive e-mail notification when new information is available in [address] 9. Click Sign Up. You can now view and download portions of your medical record. 10. Click the Download Summary menu link to download a portable copy of your medical information. If you have questions, please visit the Frequently Asked Questions section of the Dynasil website. Remember, Dynasil is NOT to be used for urgent needs. For medical emergencies, dial 911. Now available from your iPhone and Android! Please provide this summary of care documentation to your next provider. Your primary care clinician is listed as Lorena Boyle If you have any questions after today's visit, please call [phone].

## 2017-05-25 NOTE — PROGRESS NOTES
Chief Complaint   Patient presents with   [facility] Seizure    Labs     results     1. Have you been to the ER, urgent care clinic since your last visit? Hospitalized since your last visit?  no    2. Have you seen or consulted any other health care providers outside of the [address] [place] [address] Drive since your last visit? Include any pap smears or colon screening.  no

## 2017-05-30 NOTE — PROGRESS NOTES
Neurology Progress Note    NAME:  Wanda Ross   :   1985   MRN:   [id number]     Date/Time:  2017  Subjective: Wanda Ross is a 27 y.o. male here today for  follow up. Says he had mild seiizure since last visit, has however ,been having flu symptoms since for more than two weeks. Says the flu like symptoms has made him extremenly  Tired and has been having a lot of headaches. Headache has been throbbing in nature associated with dizziness. There has been neck pain, no difficulty swallowing or chest pain. Experiences fatigue, numbness and tingling sensation. Denies difficulty swallowing, breathing or chest pain. Denies constipation,diarrhea,dysuria, hematuria, hematochezia  Patients' Lupus profile was negative    Review of Systems:   Neurological ROS: positive for - dizziness, headaches, numbness/tingling, seizures, tremors and weakness         []Unable to obtain  ROS due to  []mental status change  []sedated   []intubated    Medications reviewed:  Current Outpatient Prescriptions   Medication Sig Dispense Refill    ergocalciferol (ERGOCALCIFEROL) 50,000 unit capsule Take 1 Cap by mouth every seven (7) days. 4 Cap 3    divalproex DR (DEPAKOTE) 250 mg tablet Take 2 Tabs by mouth nightly. 60 Tab 1    levETIRAcetam (KEPPRA) 500 mg tablet Take 1 Tab by mouth two (2) times a day. 60 Tab 2    tiZANidine (ZANAFLEX) 4 mg tablet Take 1 Tab by mouth nightly. 30 Tab 2    HYDROcodone-acetaminophen (NORCO)  mg tablet Take 1 Tab by mouth every eight (8) hours as needed for Pain. Max Daily Amount: 3 Tabs. 40 Tab 0    gabapentin (NEURONTIN) 300 mg capsule Take 1 Cap by mouth three (3) times daily. 90 Cap 1    amoxicillin (AMOXIL) 500 mg capsule Take 1 Cap by mouth three (3) times daily. 21 Cap 1    albuterol (VENTOLIN HFA) 90 mcg/actuation inhaler Take 2 Puffs by inhalation every four (4) hours as needed for Wheezing.  1 Inhaler 0        Objective:   Vitals:  Vitals:    17 1138   BP: 137/75   Pulse: 70   Resp: 16   Temp: 99.2 °F (37.3 °C)   TempSrc: Temporal   SpO2: 97%   Weight: 166 lb 12.8 oz (75.7 kg)   Height: 5' 8\" (1.727 m)   PainSc:  10 - Worst pain ever   PainLoc: Leg               Lab Data Reviewed:  Lab Results   Component Value Date/Time    WBC 5.6 01/18/2017 12:32 PM    HCT 42.1 01/18/2017 12:32 PM    HGB 14.2 01/18/2017 12:32 PM    PLATELET 124 35/20/7633 12:32 PM       Lab Results   Component Value Date/Time    Sodium 141 01/18/2017 01:44 PM    Potassium 3.6 01/18/2017 01:44 PM    Chloride 105 01/18/2017 01:44 PM    CO2 26 01/18/2017 01:44 PM    Glucose 98 01/18/2017 01:44 PM    BUN 10 01/18/2017 01:44 PM    Creatinine 0.91 01/18/2017 01:44 PM    Calcium 9.4 01/18/2017 01:44 PM       No components found for: TROPQUANT    No results found for: WARREN      No results found for: HBA1C, HGBE8, DQC8LNIP, YWS4MPMB     No results found for: B12LT, FOL, RBCF    No results found for: WRAREN, ANARX, ANAIGG, XBANA    No results found for: CHOL, CHOLPOCT, CHOLX, CHLST, CHOLV, HDL, HDLPOC, LDL, LDLCPOC, NLDLCT, DLDL, LDLC, DLDLP, VLDLC, VLDL, TGL, TGLX, TRIGL, [id number], TRIGP, TGLPOCT, CHHD, CHHDX      CT Results (recent):    Results from Hospital Encounter encounter on 04/24/12   CT HEAD WO CONT   Narrative **Final Report**      ICD Codes / Adm. Diagnosis: 52  838439 / Headache  Generalized Body Aches  Examination:  CT HEAD WO CON  - [id number] - Apr 24 2012  4:17PM  Accession No:  [id number]  Reason:  headache, seizure      REPORT:  Indication: Headache, seizure    Comparison to 10/13/2011    Multiple axial images were obtained from the skull base to the vertex   without the use of intravenous contrast. Ventricles and sulci are normal for   patient's age. There is no midline shift or herniation. The examination is   negative for acute infarct, mass lesion, or hemorrhage. The visualized   portions of the petrous temporal bones, paranasal sinuses, and orbits are   unremarkable. IMPRESSION: No acute process. Signing/Reading Doctor: Bob Gomez ([id number])    Approved: Bob Gomez ([id number])  04/24/2012                                      MRI Results (recent):    Results from [place] encounter on 07/25/16   MRI BRAIN W WO CONT   Narrative **Final Report**      ICD Codes / Adm. 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 TECHNIQUE: Sagittal T1, axial FLAIR, T2, T1 and gradient echo T2-weighted   images of the head were obtained followed by intravenous infusion 6 mL   Gadavist repeat axial and coronal T1-weighted images and axial diffusion   weighted images. Thin angled coronal T2 and FLAIR images through the   temporal lobes. Sagittal FLAIR image of the whole head. COMPARISON: None available. FINDINGS:  The ventricular size and configuration are normal. Incidental note of a   small midline 1 cm cyst posterior to the third ventricle but appears   somewhat separate but abutting the superior pineal gland. 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 This should be of no   clinical significance as it is causing no significant mass effect and should   be developmental in origin. Normal signal demonstrated in the cerebral   hemispheres, brain stem and cerebellum. No abnormal areas of intracranial enhancement. No abnormal diffusion. No evidence of intracranial hemorrhage, acute infarct, mass or abnormal   extra-axial fluid collections. Normal flow-voids are present in the vertebral, basilar and carotid artery   systems.     Note is made of diminished T1 hyperintensity in the upper cervical spine   marrow signal. This may be related to red marrow replacement. The structures of the cranial base including paranasal sinuses are otherwise   unremarkable. IMPRESSION:   1. Diminished marrow signal in the upper cervical spine may be within normal   variation due to red marrow replacement. Correlate clinically. 2. Otherwise essentially normal MRI of the head. Signing/Reading Doctor: Janeen Davis ([id number])    Approved: Janeen Davis ([id number])  Jul 25 2016  5:11PM                                   IR Results (recent):  No results found for this or any previous visit. VAS/US Results (recent):  No results found for this or any previous visit. PHYSICAL EXAM:  General:    Alert, cooperative, no distress, appears stated age. Head:   Normocephalic, without obvious abnormality, atraumatic. Eyes:   Conjunctivae/corneas clear. PERRLA  Nose:  Nares normal. No drainage or sinus tenderness. Throat:    Lips, mucosa, and tongue normal.  No Thrush  Neck:  Supple, symmetrical,  no adenopathy, thyroid: non tender    no carotid bruit and no JVD. Back:    Symmetric,  No CVA tenderness. Lungs:   Clear to auscultation bilaterally. No Wheezing or Rhonchi. No rales. Chest wall:  No tenderness or deformity. No Accessory muscle use. Heart:   Regular rate and rhythm,  no murmur, rub or gallop. Abdomen:   Soft, non-tender. Not distended. Bowel sounds normal. No masses  Extremities: Extremities normal, atraumatic, No cyanosis. No edema. No clubbing  Skin:     Texture, turgor normal. No rashes or lesions. Not Jaundiced  Lymph nodes: Cervical, supraclavicular normal.  Psych:  Good insight. Not depressed. Not anxious or agitated. NEUROLOGICAL EXAM:  Appearance: The patient is well developed, well nourished, provides a coherent history and is in no acute distress. Mental Status: Oriented to time, place and person. Mood and affect appropriate. Cranial Nerves:   Intact visual fields. Fundi are benign. TANG, EOM's full, no nystagmus, no ptosis. Facial sensation is normal. Corneal reflexes are intact. Facial movement is symmetric. Hearing is normal bilaterally. Palate is midline with normal sternocleidomastoid and trapezius muscles are normal. Tongue is midline. Motor:  5/5 strength in upper and lower proximal and distal muscles. Normal bulk and tone. No fasciculations. Reflexes:   Deep tendon reflexes 2+/4 and symmetrical.   Sensory:   Normal to touch, pinprick and vibration. Gait:  Normal gait. Tremor:   No tremor noted. Cerebellar:  No cerebellar signs present. Neurovascular:  Normal heart sounds and regular rhythm, peripheral pulses intact, and no carotid bruits. Assesment  1. Partial symptomatic epilepsy with complex partial seizures, not intractable, without status epilepticus ([facility] [address]      2. Paresthesia      3. Migraine without aura and without status migrainosus, not intractable      4. Pain in both lower extremities      5. Myofascial muscle pain      6. Upper respiratory tract infection, unspecified type      ___________________________________________________  PLAN:Medication reviewed with patient      ICD-10-CM ICD-9-CM    1. Partial symptomatic epilepsy with complex partial seizures, not intractable, without status epilepticus ([address] G40.209 345.40    2. Paresthesia R20.2 782.0    3. Migraine without aura and without status migrainosus, not intractable G43.009 346.10    4. Pain in both lower extremities M79.604 729.5     M79.605     5. Myofascial muscle pain M79.1 729.1    6. Upper respiratory tract infection, unspecified type J06.9 465.9      Follow-up Disposition:  Return in about 3 months (around 8/25/2017).          ___________________________________________________    Total time spent with patient:  []15   []25   []35   [] __ minutes    Care Plan discussed with:    []Patient   []Family    []Care Manager   []Consultant/Specialist :    ___________________________________________________    Attending Physician: Kaitlin Dsouza, MD

## 2017-08-28 RX ORDER — DIVALPROEX SODIUM 250 MG/1
TABLET, DELAYED RELEASE ORAL
Qty: 60 TAB | Refills: 0 | Status: SHIPPED | OUTPATIENT
Start: 2017-08-28 | End: 2018-03-12

## 2017-10-11 RX ORDER — LEVETIRACETAM 500 MG/1
TABLET ORAL
Qty: 60 TAB | Refills: 0 | Status: SHIPPED | OUTPATIENT
Start: 2017-10-11 | End: 2018-02-21 | Stop reason: SDUPTHER

## 2017-11-09 ENCOUNTER — APPOINTMENT (OUTPATIENT)
Dept: GENERAL RADIOLOGY | Age: 32
End: 2017-11-09
Attending: PHYSICIAN ASSISTANT
Payer: MEDICAID

## 2017-11-09 ENCOUNTER — HOSPITAL ENCOUNTER (EMERGENCY)
Age: 32
Discharge: HOME | End: 2017-11-09
Attending: EMERGENCY MEDICINE
Payer: MEDICAID

## 2017-11-09 VITALS
TEMPERATURE: 98 F | OXYGEN SATURATION: 98 % | HEIGHT: 68 IN | WEIGHT: 166.67 LBS | RESPIRATION RATE: 16 BRPM | DIASTOLIC BLOOD PRESSURE: 93 MMHG | BODY MASS INDEX: 25.26 KG/M2 | HEART RATE: 75 BPM | SYSTOLIC BLOOD PRESSURE: 150 MMHG

## 2017-11-09 DIAGNOSIS — J01.00 ACUTE MAXILLARY SINUSITIS, RECURRENCE NOT SPECIFIED: Primary | ICD-10-CM

## 2017-11-09 LAB
DEPRECATED S PYO AG THROAT QL EIA: NEGATIVE
FLUAV AG NPH QL IA: NEGATIVE
FLUBV AG NOSE QL IA: NEGATIVE

## 2017-11-09 PROCEDURE — 87880 STREP A ASSAY W/OPTIC: CPT | Performed by: PHYSICIAN ASSISTANT

## 2017-11-09 PROCEDURE — 87070 CULTURE OTHR SPECIMN AEROBIC: CPT | Performed by: EMERGENCY MEDICINE

## 2017-11-09 PROCEDURE — 71020 XR CHEST PA LAT: CPT

## 2017-11-09 PROCEDURE — 87804 INFLUENZA ASSAY W/OPTIC: CPT | Performed by: PHYSICIAN ASSISTANT

## 2017-11-09 PROCEDURE — 99283 EMERGENCY DEPT VISIT LOW MDM: CPT

## 2017-11-09 PROCEDURE — 74011250637 HC RX REV CODE- 250/637: Performed by: PHYSICIAN ASSISTANT

## 2017-11-09 RX ORDER — FLUTICASONE PROPIONATE 50 MCG
2 SPRAY, SUSPENSION (ML) NASAL DAILY
Qty: 1 BOTTLE | Refills: 0 | Status: SHIPPED | OUTPATIENT
Start: 2017-11-09 | End: 2018-03-12

## 2017-11-09 RX ORDER — AMOXICILLIN AND CLAVULANATE POTASSIUM 875; 125 MG/1; MG/1
1 TABLET, FILM COATED ORAL 2 TIMES DAILY
Qty: 20 TAB | Refills: 0 | Status: SHIPPED | OUTPATIENT
Start: 2017-11-09 | End: 2017-11-19

## 2017-11-09 RX ORDER — ACETAMINOPHEN 325 MG/1
650 TABLET ORAL
Status: COMPLETED | OUTPATIENT
Start: 2017-11-09 | End: 2017-11-09

## 2017-11-09 RX ORDER — ACETAMINOPHEN 325 MG/1
650 TABLET ORAL
Qty: 20 TAB | Refills: 0 | Status: SHIPPED | OUTPATIENT
Start: 2017-11-09 | End: 2018-03-12

## 2017-11-09 RX ORDER — LORATADINE 10 MG/1
10 TABLET ORAL DAILY
Qty: 20 TAB | Refills: 0 | Status: SHIPPED | OUTPATIENT
Start: 2017-11-09 | End: 2018-03-12

## 2017-11-09 RX ORDER — GUAIFENESIN 100 MG/5ML
200 SOLUTION ORAL
Qty: 118 ML | Refills: 0 | Status: SHIPPED | OUTPATIENT
Start: 2017-11-09 | End: 2018-03-12

## 2017-11-09 RX ADMIN — ACETAMINOPHEN 650 MG: 325 TABLET ORAL at 14:05

## 2017-11-09 NOTE — ED PROVIDER NOTES
Patient is a 34 y.o. male presenting with cough, sore throat, and nasal congestion. The history is provided by the patient. Cough   This is a new (Productive cough, congestion, rhinorrhea, nasal congestion, chills, GBA, sinus pain, sore throat x 3 weeks.) problem. The current episode started more than 1 week ago. The problem occurs constantly. The problem has not changed since onset. The cough is productive of sputum. There has been no fever. Associated symptoms include chills, headaches, sore throat and myalgias. Pertinent negatives include no chest pain, no sweats, no weight loss, no eye redness, no ear congestion, no ear pain, no rhinorrhea, no shortness of breath, no wheezing, no nausea, no vomiting and no confusion. He has tried nothing for the symptoms. He is a smoker. His past medical history is significant for bronchitis. His past medical history does not include pneumonia or asthma. Sore Throat    This is a new problem. The current episode started more than 1 week ago. The problem has not changed since onset. There has been no fever. Associated symptoms include congestion, headaches and cough. Pertinent negatives include no diarrhea, no vomiting, no drooling, no ear discharge, no ear pain, no plugged ear sensation, no shortness of breath, no stridor, no swollen glands, no trouble swallowing and no stiff neck. He has tried nothing for the symptoms. Nasal Congestion   This is a new problem. The current episode started more than 1 week ago. The problem occurs constantly. The problem has not changed since onset. Associated symptoms include headaches. Pertinent negatives include no chest pain, no abdominal pain and no shortness of breath. Nothing aggravates the symptoms. Nothing relieves the symptoms. He has tried nothing for the symptoms. Past Medical History:   Diagnosis Date    Neurological disorder     seizures    Seizures ([facility] [address]        History reviewed. No pertinent surgical history.       Family History:   Problem Relation Age of Onset    Seizures Mother     Hypertension Mother     Asthma Mother        Social History     Social History    Marital status: SINGLE     Spouse name: N/A    Number of children: N/A    Years of education: N/A     Occupational History    Not on file. Social History Main Topics    Smoking status: Current Some Day Smoker     Packs/day: 1.00    Smokeless tobacco: Never Used    Alcohol use No    Drug use: Yes     Special: Marijuana      Comment: occasion    Sexual activity: No     Other Topics Concern    Not on file     Social History Narrative    ** Merged History Encounter **              ALLERGIES: Aspirin and Aspirin    Review of Systems   Constitutional: Positive for chills and fever. Negative for activity change, appetite change, diaphoresis, fatigue and weight loss. HENT: Positive for congestion, postnasal drip, sinus pain, sinus pressure and sore throat. Negative for dental problem, drooling, ear discharge, ear pain, facial swelling, hearing loss, nosebleeds, rhinorrhea, trouble swallowing and voice change. Eyes: Negative. Negative for pain and redness. Respiratory: Positive for cough. Negative for apnea, chest tightness, shortness of breath, wheezing and stridor. Cardiovascular: Negative. Negative for chest pain. Gastrointestinal: Negative. Negative for abdominal pain, constipation, diarrhea, nausea and vomiting. Genitourinary: Negative. Negative for dysuria. Musculoskeletal: Positive for myalgias. Negative for arthralgias, back pain, gait problem, joint swelling, neck pain and neck stiffness. Skin: Negative. Negative for pallor, rash and wound. Neurological: Positive for headaches. Negative for dizziness, seizures, syncope, weakness, light-headedness and numbness. Psychiatric/Behavioral: Negative. Negative for confusion.        Vitals:    11/09/17 1354   BP: (!) 150/93   Pulse: 75   Resp: 16   Temp: 98 °F (36.7 °C)   SpO2: 98% Weight: 75.6 kg (166 lb 10.7 oz)   Height: 5' 8\" (1.727 m)            Physical Exam   Constitutional: He is oriented to person, place, and time. He appears well-developed and well-nourished. No distress. HENT:   Head: Normocephalic and atraumatic. Right Ear: Hearing, tympanic membrane, external ear and ear canal normal.   Left Ear: Hearing, tympanic membrane, external ear and ear canal normal.   Nose: Mucosal edema and rhinorrhea present. Right sinus exhibits maxillary sinus tenderness and frontal sinus tenderness. Left sinus exhibits maxillary sinus tenderness and frontal sinus tenderness. Mouth/Throat: Uvula is midline and mucous membranes are normal. Mucous membranes are not pale and not dry. Posterior oropharyngeal erythema (mild) present. No oropharyngeal exudate, posterior oropharyngeal edema or tonsillar abscesses. Eyes: Conjunctivae and EOM are normal. Pupils are equal, round, and reactive to light. Neck: Normal range of motion. Neck supple. Cardiovascular: Normal rate, regular rhythm, normal heart sounds and intact distal pulses. Pulmonary/Chest: Effort normal and breath sounds normal. No accessory muscle usage. No respiratory distress. He has no decreased breath sounds. He has no wheezes. He has no rhonchi. He has no rales. Abdominal: Soft. There is no tenderness. There is no rigidity, no rebound, no guarding, no CVA tenderness, no tenderness at McBurney's point and negative Stephen's sign. Musculoskeletal: Normal range of motion. Neurological: He is alert and oriented to person, place, and time. No cranial nerve deficit. Skin: Skin is warm, dry and intact. No rash noted. He is not diaphoretic. No pallor. Psychiatric: He has a normal mood and affect. His speech is normal and behavior is normal. Judgment and thought content normal.   Nursing note and vitals reviewed.        MDM  Number of Diagnoses or Management Options  Acute maxillary sinusitis, recurrence not specified: Diagnosis management comments: DDx: sinusitis, bronchitis, pnx, uri, allergic rhinitis, flu, strep    LABORATORY TESTS:  Recent Results (from the past 12 hour(s))  -STREP AG SCREEN, GROUP A  Collection Time: 11/09/17  2:06 PM       Result                                            Value                         Ref Range                       Group A Strep Ag ID                               NEGATIVE                      NEG                        -INFLUENZA A & B AG (RAPID TEST)  Collection Time: 11/09/17  2:06 PM       Result                                            Value                         Ref Range                       Influenza A Antigen                               NEGATIVE                      NEG                             Influenza B Antigen                               NEGATIVE                      NEG                          IMAGING RESULTS:  XR CHEST PA LAT   Final Result   Direct comparison is made to prior CXR dated January 2017.     Findings: Cardiomediastinal silhouette is within normal limits. Lungs are clear  bilaterally. Pleural spaces are normal. Osseous structures are intact.     IMPRESSION  IMPRESSION: No acute cardiopulmonary disease. MEDICATIONS GIVEN:  Medications  acetaminophen (TYLENOL) tablet 650 mg (650 mg Oral Given 11/9/17 1405)    IMPRESSION:  Acute maxillary sinusitis, recurrence not specified  (primary encounter diagnosis)    PLAN:  1. Current Discharge Medication List    START taking these medications    acetaminophen (TYLENOL) 325 mg tablet  Take 2 Tabs by mouth every four (4) hours as needed for Pain. Qty: 20 Tab Refills: 0    loratadine (CLARITIN) 10 mg tablet  Take 1 Tab by mouth daily. Qty: 20 Tab Refills: 0    fluticasone (FLONASE) 50 mcg/actuation nasal spray  2 Sprays by Both Nostrils route daily. Qty: 1 Bottle Refills: 0    amoxicillin-clavulanate (AUGMENTIN) 875-125 mg per tablet  Take 1 Tab by mouth two (2) times a day for 10 days.   Qty: 20 Tab Refills: 0    guaiFENesin (ROBITUSSIN) 100 mg/5 mL liquid  Take 10 mL by mouth three (3) times daily as needed for Cough. Qty: 118 mL Refills: 0      CONTINUE these medications which have NOT CHANGED    levETIRAcetam (KEPPRA) 500 mg tablet  TAKE ONE TABLET BY MOUTH TWICE A DAY  Qty: 60 Tab Refills: 0    divalproex DR (DEPAKOTE) 250 mg tablet  TAKE TWO TABLETS BY MOUTH EVERY EVENING  Qty: 60 Tab Refills: 0    ergocalciferol (ERGOCALCIFEROL) 50,000 unit capsule  Take 1 Cap by mouth every seven (7) days. Qty: 4 Cap Refills: 3    tiZANidine (ZANAFLEX) 4 mg tablet  Take 1 Tab by mouth nightly. Qty: 30 Tab Refills: 2    gabapentin (NEURONTIN) 300 mg capsule  Take 1 Cap by mouth three (3) times daily. Qty: 90 Cap Refills: 1        2. Follow-up Information     Follow up With Details Comments Sundar Jung III, MD Schedule an appointment as soon as possible for a   visit in 1 week As needed, If symptoms worsen [facility]  [phone]        Return to ED if worse                  Amount and/or Complexity of Data Reviewed  Clinical lab tests: ordered and reviewed  Tests in the radiology section of CPT®: reviewed and ordered  Tests in the medicine section of CPT®: ordered and reviewed    Patient Progress  Patient progress: stable    ED Course       Procedures    2:58 PM  I have discussed with patient their diagnosis, treatment, and follow up plan. The patient agrees to follow up as outlined in discharge paperwork and also to return to the ED with any worsening.  Lenny Meza PA-C

## 2017-11-09 NOTE — DISCHARGE INSTRUCTIONS
Sinusitis: Care Instructions  Your Care Instructions    Sinusitis is an infection of the lining of the sinus cavities in your head. Sinusitis often follows a cold. It causes pain and pressure in your head and face. In most cases, sinusitis gets better on its own in 1 to 2 weeks. But some mild symptoms may last for several weeks. Sometimes antibiotics are needed. Follow-up care is a key part of your treatment and safety. Be sure to make and go to all appointments, and call your doctor if you are having problems. It's also a good idea to know your test results and keep a list of the medicines you take. How can you care for yourself at home? · Take an over-the-counter pain medicine, such as acetaminophen (Tylenol), ibuprofen (Advil, Motrin), or naproxen (Aleve). Read and follow all instructions on the label. · If the doctor prescribed antibiotics, take them as directed. Do not stop taking them just because you feel better. You need to take the full course of antibiotics. · Be careful when taking over-the-counter cold or flu medicines and Tylenol at the same time. Many of these medicines have acetaminophen, which is Tylenol. Read the labels to make sure that you are not taking more than the recommended dose. Too much acetaminophen (Tylenol) can be harmful. · Breathe warm, moist air from a steamy shower, a hot bath, or a sink filled with hot water. Avoid cold, dry air. Using a humidifier in your home may help. Follow the directions for cleaning the machine. · Use saline (saltwater) nasal washes to help keep your nasal passages open and wash out mucus and bacteria. You can buy saline nose drops at a grocery store or drugstore. Or you can make your own at home by adding 1 teaspoon of salt and 1 teaspoon of baking soda to 2 cups of distilled water. If you make your own, fill a bulb syringe with the solution, insert the tip into your nostril, and squeeze gently. Kedarilyn Ply your nose.   · Put a hot, wet towel or a warm gel pack on your face 3 or 4 times a day for 5 to 10 minutes each time. · Try a decongestant nasal spray like oxymetazoline (Afrin). Do not use it for more than 3 days in a row. Using it for more than 3 days can make your congestion worse. When should you call for help? Call your doctor now or seek immediate medical care if:  ? · You have new or worse swelling or redness in your face or around your eyes. ? · You have a new or higher fever. ? Watch closely for changes in your health, and be sure to contact your doctor if:  ? · You have new or worse facial pain. ? · The mucus from your nose becomes thicker (like pus) or has new blood in it. ? · You are not getting better as expected. Where can you learn more? Go to http://emilia-johnny.info/. Enter Y323 in the search box to learn more about \"Sinusitis: Care Instructions. \"  Current as of: May 12, 2017  Content Version: 11.4  © 8338-1862 [employer]. Care instructions adapted under license by [employer] (which disclaims liability or warranty for this information). If you have questions about a medical condition or this instruction, always ask your healthcare professional. [place] [address] any warranty or liability for your use of this information. Saline Nasal Washes: Care Instructions  Your Care Instructions  Saline nasal washes help keep the nasal passages open by washing out thick or dried mucus. This simple remedy can help relieve symptoms of allergies, sinusitis, and colds. It also can make the nose feel more comfortable by keeping the mucous membranes moist. You may notice a little burning sensation in your nose the first few times you use the solution, but this usually gets better in a few days. Follow-up care is a key part of your treatment and safety. Be sure to make and go to all appointments, and call your doctor if you are having problems.  It's also a good idea to know your test results and keep a list of the medicines you take. How can you care for yourself at home? · You can buy premixed saline solution in a squeeze bottle or other sinus rinse products at a drugstore. Read and follow the instructions on the label. · You also can make your own saline solution by adding 1 teaspoon of salt and 1 teaspoon of baking soda to 2 cups of distilled water. · If you use a homemade solution, pour a small amount into a clean bowl. Using a rubber bulb syringe, squeeze the syringe and place the tip in the salt water. Pull a small amount of the salt water into the syringe by relaxing your hand. · Sit down with your head tilted slightly back. Do not lie down. Put the tip of the bulb syringe or the squeeze bottle a little way into one of your nostrils. Gently drip or squirt a few drops into the nostril. Repeat with the other nostril. Some sneezing and gagging are normal at first.  · Gently blow your nose. · Wipe the syringe or bottle tip clean after each use. · Repeat this 2 or 3 times a day. · Use nasal washes gently if you have nosebleeds often. When should you call for help? Watch closely for changes in your health, and be sure to contact your doctor if:  ? · You often get nosebleeds. ? · You have problems doing the nasal washes. Where can you learn more? Go to http://emilia-johnny.info/. Enter [id number] [phone] in the search box to learn more about \"Saline Nasal Washes: Care Instructions. \"  Current as of: May 12, 2017  Content Version: 11.4  © 4950-8973 [employer]. Care instructions adapted under license by [employer] (which disclaims liability or warranty for this information). If you have questions about a medical condition or this instruction, always ask your healthcare professional. [address] any warranty or liability for your use of this information.

## 2017-11-09 NOTE — ED NOTES
Maryse Saldana PA-C reviewed discharge instructions with the patient. The patient verbalized understanding. All questions and concerns were addressed. The patient declined a wheelchair and is discharged ambulatory in the care of family members with instructions and prescriptions in hand. Pt is alert and oriented x 4. Respirations are clear and unlabored.

## 2017-11-11 LAB
BACTERIA SPEC CULT: NORMAL
SERVICE CMNT-IMP: NORMAL

## 2018-02-21 ENCOUNTER — OFFICE VISIT (OUTPATIENT)
Dept: INTERNAL MEDICINE CLINIC | Age: 33
End: 2018-02-21

## 2018-02-21 VITALS
HEIGHT: 68 IN | HEART RATE: 73 BPM | DIASTOLIC BLOOD PRESSURE: 79 MMHG | RESPIRATION RATE: 18 BRPM | WEIGHT: 169.1 LBS | OXYGEN SATURATION: 98 % | BODY MASS INDEX: 25.63 KG/M2 | SYSTOLIC BLOOD PRESSURE: 115 MMHG | TEMPERATURE: 98.3 F

## 2018-02-21 DIAGNOSIS — Z11.3 SCREEN FOR STD (SEXUALLY TRANSMITTED DISEASE): ICD-10-CM

## 2018-02-21 DIAGNOSIS — Z00.00 WELL ADULT EXAM: ICD-10-CM

## 2018-02-21 DIAGNOSIS — G43.809 OTHER MIGRAINE WITHOUT STATUS MIGRAINOSUS, NOT INTRACTABLE: ICD-10-CM

## 2018-02-21 DIAGNOSIS — G40.909 SEIZURE DISORDER (HCC): Primary | ICD-10-CM

## 2018-02-21 DIAGNOSIS — M32.9 SYSTEMIC LUPUS ERYTHEMATOSUS, UNSPECIFIED SLE TYPE, UNSPECIFIED ORGAN INVOLVEMENT STATUS (HCC): ICD-10-CM

## 2018-02-21 DIAGNOSIS — Z23 ENCOUNTER FOR IMMUNIZATION: ICD-10-CM

## 2018-02-21 RX ORDER — LEVETIRACETAM 500 MG/1
TABLET ORAL
Qty: 60 TAB | Refills: 3 | Status: SHIPPED | OUTPATIENT
Start: 2018-02-21 | End: 2018-03-12 | Stop reason: DRUGHIGH

## 2018-02-21 NOTE — MR AVS SNAPSHOT
[address] [facility] [address] [address] 
[phone] Patient: Frank Jiménez MRN: [id number] [facility]:[birth year] Visit Information Date & Time Provider Department Dept. Phone Encounter #  
 2/21/2018  2:30 PM Lucy lEam, [id number] [id number] Follow-up Instructions Return for f/u seizures and SLE. Upcoming Health Maintenance Date Due Pneumococcal 19-64 Medium Risk (1 of 1 - PPSV23) 8/20/2003 DTaP/Tdap/Td series (1 - Tdap) 6/20/2011 Allergies as of 2/21/2018  Review Complete On: 2/21/2018 By: Carl Vicente LPN Severity Noted Reaction Type Reactions Aspirin  06/19/2011    Hives Aspirin  10/13/2011   Intolerance Anaphylaxis Current Immunizations  Never Reviewed Name Date Pneumococcal Polysaccharide (PPSV-23)  Incomplete TD Vaccine 6/19/2011 11:16 PM  
 Tdap  Incomplete Not reviewed this visit You Were Diagnosed With   
  
 Codes Comments Well adult exam    -  Primary ICD-10-CM: Z00.00 ICD-9-CM: V70.0 Seizure disorder ([email or web address]     ICD-10-CM: G40.909 ICD-9-CM: 345.90 Encounter for immunization     ICD-10-CM: V87 ICD-9-CM: V03.89 Screen for STD (sexually transmitted disease)     ICD-10-CM: Z11.3 ICD-9-CM: V74.5 Systemic lupus erythematosus, unspecified SLE type, unspecified organ involvement status ([facility] [[email or web address] 75.)     ICD-10-CM: M32.9 ICD-9-CM: 710.0 Vitals BP Pulse Temp Resp Height(growth percentile) Weight(growth percentile) 115/79 (BP 1 Location: Left arm, BP Patient Position: Sitting) 73 98.3 °F (36.8 °C) (Oral) 18 5' 8\" (1.727 m) 169 lb 1.6 oz (76.7 kg) SpO2 BMI Smoking Status 98% 25.71 kg/m2 Current Some Day Smoker Vitals History BMI and BSA Data Body Mass Index Body Surface Area 25.71 kg/m 2 1.92 m 2 Preferred Pharmacy Pharmacy Name Phone [facility] [address] [facility] [phone] Your Updated Medication List  
  
   
This list is accurate as of 2/21/18  3:09 PM.  Always use your most recent med list.  
  
  
  
  
 acetaminophen 325 mg tablet Commonly known as:  TYLENOL Take 2 Tabs by mouth every four (4) hours as needed for Pain.  
  
 divalproex  mg tablet Commonly known as:  DEPAKOTE  
TAKE TWO TABLETS BY MOUTH EVERY EVENING  
  
 ergocalciferol 50,000 unit capsule Commonly known as:  ERGOCALCIFEROL Take 1 Cap by mouth every seven (7) days. fluticasone 50 mcg/actuation nasal spray Commonly known as:  Sharmaine Plata 2 Sprays by Both Nostrils route daily. gabapentin 300 mg capsule Commonly known as:  NEURONTIN Take 1 Cap by mouth three (3) times daily. guaiFENesin 100 mg/5 mL liquid Commonly known as:  ROBITUSSIN Take 10 mL by mouth three (3) times daily as needed for Cough. levETIRAcetam 500 mg tablet Commonly known as:  KEPPRA TAKE ONE TABLET BY MOUTH TWICE A DAY  
  
 loratadine 10 mg tablet Commonly known as:  Ana Maria Jong Take 1 Tab by mouth daily. tiZANidine 4 mg tablet Commonly known as:  Javi Flattery Take 1 Tab by mouth nightly. Prescriptions Sent to Pharmacy Refills  
 levETIRAcetam (KEPPRA) 500 mg tablet 3 Sig: TAKE ONE TABLET BY MOUTH TWICE A DAY Class: Normal  
 Pharmacy: [facility] [address] [facility] Ph #: [phone] We Performed the Following CHLAMYDIA/GC PCR [72202 CPT(R)] HEPATITIS C AB [19440 CPT(R)] HIV 1/2 AG/AB, 4TH GENERATION,W RFLX CONFIRM [id number] CPT(R)] LIPID PANEL [35213 CPT(R)] PNEUMOCOCCAL POLYSACCHARIDE VACCINE, 23-VALENT, ADULT OR IMMUNOSUPPRESSED PT DOSE, [14968 CPT(R)] REFERRAL TO NEUROLOGY [LWJ70 Custom] REFERRAL TO RHEUMATOLOGY [SER38 Custom] T PALLIDUM SCREEN W/REFLEX [OVT16270 Custom] TETANUS, DIPHTHERIA TOXOIDS AND ACELLULAR PERTUSSIS VACCINE (TDAP), IN INDIVIDS. >=7, IM [id number] CPT(R)] Follow-up Instructions Return for f/u seizures and SLE. Referral Information Referral ID Referred By Referred To  
  
 [id number] Carlie SILVEIRA Jh Gutierrez [address] [place], [address] [facility] Phone: [phone] Fax: [phone] Visits Status Start Date End Date 1 New Request 2/21/18 2/21/19 If your referral has a status of pending review or denied, additional information will be sent to support the outcome of this decision. Referral ID Referred By Referred To  
 [id number] Diana SILVEIRA MD  
   [id number] [place] [address] Phone: [phone] Fax: [phone] Visits Status Start Date End Date 1 New Request 2/21/18 2/21/19 If your referral has a status of pending review or denied, additional information will be sent to support the outcome of this decision. Introducing [facility]! Krysten Ambrocio introduces [employer] patient portal. Now you can access parts of your medical record, email your doctor's office, and request medication refills online. 1. In your internet browser, go to [email or web address] 2. Click on the First Time User? Click Here link in the Sign In box. You will see the New Member Sign Up page. 3. Enter your [employer] Access Code exactly as it appears below. You will not need to use this code after youve completed the sign-up process. If you do not sign up before the expiration date, you must request a new code. · [employer] Access Code: [email or web address] Expires: 5/22/2018  2:31 PM 
 
4. Enter the last four digits of your Social Security Number (xxxx) and Date of Birth (mm/dd/yyyy) as indicated and click Submit. You will be taken to the next sign-up page. 5. Create a [employer] ID. This will be your [employer] login ID and cannot be changed, so think of one that is secure and easy to remember. 6. Create a [employer] password. You can change your password at any time. 7. Enter your Password Reset Question and Answer. This can be used at a later time if you forget your password. 8. Enter your e-mail address. You will receive e-mail notification when new information is available in [address] 9. Click Sign Up. You can now view and download portions of your medical record. 10. Click the Download Summary menu link to download a portable copy of your medical information. If you have questions, please visit the Frequently Asked Questions section of the [employer] website. Remember, [employer] is NOT to be used for urgent needs. For medical emergencies, dial 911. Now available from your iPhone and Android! Please provide this summary of care documentation to your next provider. Your primary care clinician is listed as Blaze Catherine. If you have any questions after today's visit, please call [phone].

## 2018-02-21 NOTE — PROGRESS NOTES
Steff Goins is a 34 y.o. male and presents with New Patient; Establish Care; and Medication Refill  . Subjective:  Accompanied by girlfriend [place]  First visit, establish care  As per girlfriend, pt snores very heavily. She denies apneic episodes    PMH-  Seizure d/o -dx @ 14 yo   -last neuro appointment ~ 1 year ago   -last seizure ~ 3 mth ago due to noncompliance w meds, eval in ED   -neuro Dr. Ernst Raymond, but has been unable to obtain an appointment       SLE-was on MTX and prednisone given by neuro as per pt    Migraine headaches    Illiteracy    PSH none    SH  Single unemployed  On disability    Eye care 2015  Dental care> 1 year ago  Exercise-none regularly  Review of Systems  Constitutional: negative for fevers, chills, anorexia and weight loss  Respiratory:  negative for cough, hemoptysis, dyspnea,wheezing  CV:   negative for chest pain, palpitations, lower extremity edema  GI:   negative for nausea, vomiting, diarrhea, abdominal pain,melena  Musculoskel: positive for myalgias, arthralgias, back pain, joint pain  Neurological:  positive for headaches, dizziness, vertigo, memory problems   Behavl/Psych: negative for feelings of anxiety, depression, mood changes    Past Medical History:   Diagnosis Date    Neurological disorder     seizures    Seizures ([facility] [address]      History reviewed. No pertinent surgical history.   Social History     Social History    Marital status: SINGLE     Spouse name: N/A    Number of children: N/A    Years of education: N/A     Social History Main Topics    Smoking status: Current Some Day Smoker     Packs/day: 1.00    Smokeless tobacco: Never Used    Alcohol use No    Drug use: Yes     Special: Marijuana      Comment: occasion    Sexual activity: No     Other Topics Concern    None     Social History Narrative    ** Merged History Encounter **          Family History   Problem Relation Age of Onset    Seizures Mother     Hypertension Mother     Asthma Mother      Current Outpatient Prescriptions   Medication Sig Dispense Refill    levETIRAcetam (KEPPRA) 500 mg tablet TAKE ONE TABLET BY MOUTH TWICE A DAY 60 Tab 3    divalproex DR (DEPAKOTE) 250 mg tablet TAKE TWO TABLETS BY MOUTH EVERY EVENING 60 Tab 0    ergocalciferol (ERGOCALCIFEROL) 50,000 unit capsule Take 1 Cap by mouth every seven (7) days. 4 Cap 3    tiZANidine (ZANAFLEX) 4 mg tablet Take 1 Tab by mouth nightly. 30 Tab 2    gabapentin (NEURONTIN) 300 mg capsule Take 1 Cap by mouth three (3) times daily. 90 Cap 1    acetaminophen (TYLENOL) 325 mg tablet Take 2 Tabs by mouth every four (4) hours as needed for Pain. 20 Tab 0    loratadine (CLARITIN) 10 mg tablet Take 1 Tab by mouth daily. 20 Tab 0    fluticasone (FLONASE) 50 mcg/actuation nasal spray 2 Sprays by Both Nostrils route daily. 1 Bottle 0    guaiFENesin (ROBITUSSIN) 100 mg/5 mL liquid Take 10 mL by mouth three (3) times daily as needed for Cough. 118 mL 0     Allergies   Allergen Reactions    Aspirin Hives       Objective:  Visit Vitals    /79 (BP 1 Location: Left arm, BP Patient Position: Sitting)    Pulse 73    Temp 98.3 °F (36.8 °C) (Oral)    Resp 18    Ht 5' 8\" (1.727 m)    Wt 169 lb 1.6 oz (76.7 kg)    SpO2 98%    BMI 25.71 kg/m2     Physical Exam:   General appearance - alert, well appearing, and in no distress  Mental status - alert, oriented to person, place, and time  EYE-EOMI  ENT-ENT exam normal, no neck nodes or sinus tenderness  Mouth - mucous membranes moist, pharynx normal without lesions  Neck - supple, no significant adenopathy   Chest - clear to auscultation, no wheezes, rales or rhonchi, symmetric air entry   Heart - normal rate, regular rhythm, normal S1, S2  Abdomen - soft, nontender, nondistended, no masses or organomegaly  Ext-peripheral pulses normal, no pedal edema, no clubbing or cyanosis  Skin-Warm and dry.  no hyperpigmentation, vitiligo, or suspicious lesions  Neuro -alert, oriented, normal speech, no focal findings or movement disorder noted      Results for orders placed or performed during the hospital encounter of 11/09/17   STREP AG SCREEN, GROUP A   Result Value Ref Range    Group A Strep Ag ID NEGATIVE  NEG     INFLUENZA A & B AG (RAPID TEST)   Result Value Ref Range    Influenza A Antigen NEGATIVE  NEG      Influenza B Antigen NEGATIVE  NEG     CULTURE, THROAT   Result Value Ref Range    Special Requests: NO SPECIAL REQUESTS      Culture result: NORMAL RESPIRATORY LAURA/NO BETA STREP ISOLATED         Assessment/Plan:    ICD-10-CM ICD-9-CM    1. Seizure disorder (HCC) G40.909 345.90 levETIRAcetam (KEPPRA) 500 mg tablet      REFERRAL TO NEUROLOGY   2. Well adult exam Z00.00 V70.0    3. Systemic lupus erythematosus, unspecified SLE type, unspecified organ involvement status ([facility] [address] M32.9 710.0 REFERRAL TO RHEUMATOLOGY   4. Encounter for immunization Z23 V03.89 TETANUS, DIPHTHERIA TOXOIDS AND ACELLULAR PERTUSSIS VACCINE (TDAP), IN INDIVIDS. >=7, IM      PNEUMOCOCCAL POLYSACCHARIDE VACCINE, 23-VALENT, ADULT OR IMMUNOSUPPRESSED PT DOSE,   5. Screen for STD (sexually transmitted disease) Z11.3 V74.5 HIV 1/2 AG/AB, 4TH GENERATION,W RFLX CONFIRM      T PALLIDUM SCREEN W/REFLEX      CHLAMYDIA/GC PCR      HEPATITIS C AB      LIPID PANEL   6.  Other migraine without status migrainosus, not intractable G43.809 346.80      Orders Placed This Encounter    TETANUS, DIPHTHERIA TOXOIDS AND ACELLULAR PERTUSSIS VACCINE (TDAP), IN INDIVIDS. >=7, IM    PNEUMOCOCCAL POLYSACCHARIDE VACCINE, 23-VALENT, ADULT OR IMMUNOSUPPRESSED PT DOSE,    HIV 1/2 AG/AB, 4TH GENERATION,W RFLX CONFIRM    T PALLIDUM SCREEN W/REFLEX    CHLAMYDIA/GC PCR     Order Specific Question:   Sample source     Answer:   Urine [258]     Order Specific Question:   Specimen source     Answer:   Urine [258]    HEPATITIS C AB    LIPID PANEL   Malka Valencia Neurology ref [facility]     Referral Priority:   Routine     Referral Type:   Consultation     Referral Reason:   Specialty Services Required     Referred to Provider:   MD Lakshmi Thomas [facility]     Referral Priority:   Routine     Referral Type:   Consultation     Referral Reason:   Specialty Services Required     Referred to Provider:   Brown Estevez MD    levETIRAcetam (KEPPRA) 500 mg tablet     Sig: TAKE ONE TABLET BY MOUTH TWICE A DAY     Dispense:  60 Tab     Refill:  3   Check STD  Further investigate snoring NV  Refer to neuro and rheum  Cont current mngmnt  Refill keppra, hold depakote  There are no Patient Instructions on file for this visit. Follow-up Disposition:  Return for f/u seizures and SLE. I have reviewed with the patient details of the assessment and plan and all questions were answered. Relevent patient education was performed. The most recent lab findings were reviewed with the patient. An After Visit Summary was printed and given to the patient.

## 2018-02-21 NOTE — PROGRESS NOTES
1. Have you been to the ER, urgent care clinic since your last visit? Hospitalized since your last visit? No    2. Have you seen or consulted any other health care providers outside of the [facility] since your last visit? Include any pap smears or colon screening. Yes When: last month [facility] ED seizure.

## 2018-02-24 LAB
C TRACH RRNA SPEC QL NAA+PROBE: NEGATIVE
CHOLEST SERPL-MCNC: 126 MG/DL (ref 100–199)
HCV AB S/CO SERPL IA: <0.1 S/CO RATIO (ref 0–0.9)
HDLC SERPL-MCNC: 65 MG/DL
HIV 1+2 AB+HIV1 P24 AG SERPL QL IA: NON REACTIVE
INTERPRETATION, 910389: NORMAL
LDLC SERPL CALC-MCNC: 41 MG/DL (ref 0–99)
N GONORRHOEA RRNA SPEC QL NAA+PROBE: NEGATIVE
RPR SER QL: NON REACTIVE
T PALLIDUM AB SER QL IA: POSITIVE
T PALLIDUM IGG SER QL IB: POSITIVE
TRIGL SERPL-MCNC: 101 MG/DL (ref 0–149)
VLDLC SERPL CALC-MCNC: 20 MG/DL (ref 5–40)

## 2018-03-09 ENCOUNTER — DOCUMENTATION ONLY (OUTPATIENT)
Dept: INTERNAL MEDICINE CLINIC | Age: 33
End: 2018-03-09

## 2018-03-09 DIAGNOSIS — Z11.3 SCREEN FOR STD (SEXUALLY TRANSMITTED DISEASE): Primary | ICD-10-CM

## 2018-03-12 ENCOUNTER — OFFICE VISIT (OUTPATIENT)
Dept: NEUROLOGY | Age: 33
End: 2018-03-12

## 2018-03-12 VITALS
HEART RATE: 67 BPM | WEIGHT: 173.6 LBS | BODY MASS INDEX: 26.31 KG/M2 | DIASTOLIC BLOOD PRESSURE: 78 MMHG | HEIGHT: 68 IN | SYSTOLIC BLOOD PRESSURE: 118 MMHG | OXYGEN SATURATION: 99 %

## 2018-03-12 DIAGNOSIS — R56.9 SEIZURE (HCC): Primary | ICD-10-CM

## 2018-03-12 RX ORDER — LEVETIRACETAM 750 MG/1
750 TABLET ORAL 2 TIMES DAILY
Qty: 60 TAB | Refills: 2 | Status: SHIPPED | OUTPATIENT
Start: 2018-03-12 | End: 2018-06-12 | Stop reason: SDUPTHER

## 2018-03-12 NOTE — PATIENT INSTRUCTIONS
1.  Increase Keppra to 750 mg p.o. twice daily  2. EEG  3. Follow-up in 3 months     A Healthy Lifestyle: Care Instructions  Your Care Instructions    A healthy lifestyle can help you feel good, stay at a healthy weight, and have plenty of energy for both work and play. A healthy lifestyle is something you can share with your whole family. A healthy lifestyle also can lower your risk for serious health problems, such as high blood pressure, heart disease, and diabetes. You can follow a few steps listed below to improve your health and the health of your family. Follow-up care is a key part of your treatment and safety. Be sure to make and go to all appointments, and call your doctor if you are having problems. It's also a good idea to know your test results and keep a list of the medicines you take. How can you care for yourself at home? · Do not eat too much sugar, fat, or fast foods. You can still have dessert and treats now and then. The goal is moderation. · Start small to improve your eating habits. Pay attention to portion sizes, drink less juice and soda pop, and eat more fruits and vegetables. ¨ Eat a healthy amount of food. A 3-ounce serving of meat, for example, is about the size of a deck of cards. Fill the rest of your plate with vegetables and whole grains. ¨ Limit the amount of soda and sports drinks you have every day. Drink more water when you are thirsty. ¨ Eat at least 5 servings of fruits and vegetables every day. It may seem like a lot, but it is not hard to reach this goal. A serving or helping is 1 piece of fruit, 1 cup of vegetables, or 2 cups of leafy, raw vegetables. Have an apple or some carrot sticks as an afternoon snack instead of a candy bar. Try to have fruits and/or vegetables at every meal.  · Make exercise part of your daily routine. You may want to start with simple activities, such as walking, bicycling, or slow swimming. Try to be active 30 to 60 minutes every day.  You do not need to do all 30 to 60 minutes all at once. For example, you can exercise 3 times a day for 10 or 20 minutes. Moderate exercise is safe for most people, but it is always a good idea to talk to your doctor before starting an exercise program.  · Keep moving. Onel Broom the lawn, work in the garden, or [employer]. Take the stairs instead of the elevator at work. · If you smoke, quit. People who smoke have an increased risk for heart attack, stroke, cancer, and other lung illnesses. Quitting is hard, but there are ways to boost your chance of quitting tobacco for good. ¨ Use nicotine gum, patches, or lozenges. ¨ Ask your doctor about stop-smoking programs and medicines. ¨ Keep trying. In addition to reducing your risk of diseases in the future, you will notice some benefits soon after you stop using tobacco. If you have shortness of breath or asthma symptoms, they will likely get better within a few weeks after you quit. · Limit how much alcohol you drink. Moderate amounts of alcohol (up to 2 drinks a day for men, 1 drink a day for women) are okay. But drinking too much can lead to liver problems, high blood pressure, and other health problems. Family health  If you have a family, there are many things you can do together to improve your health. · Eat meals together as a family as often as possible. · Eat healthy foods. This includes fruits, vegetables, lean meats and dairy, and whole grains. · Include your family in your fitness plan. Most people think of activities such as jogging or tennis as the way to fitness, but there are many ways you and your family can be more active. Anything that makes you breathe hard and gets your heart pumping is exercise. Here are some tips:  ¨ Walk to do errands or to take your child to school or the bus. ¨ Go for a family bike ride after dinner instead of watching TV. Where can you learn more? Go to http://emilia-johnny.info/.   Enter H582 in the search box to learn more about \"A Healthy Lifestyle: Care Instructions. \"  Current as of: May 12, 2017  Content Version: 11.4  © 8169-8832 Healthwise, Incorporated. Care instructions adapted under license by [employer] (which disclaims liability or warranty for this information). If you have questions about a medical condition or this instruction, always ask your healthcare professional. [address] any warranty or liability for your use of this information.

## 2018-03-12 NOTE — MR AVS SNAPSHOT
[address], 
[address], [address] [place] [id number] Patient: Aylin Parker MRN: [id number] DDN:[id number] Visit Information Date & Time Provider Department Dept. Phone Encounter #  
 3/12/2018  9:00 AM Dario Zapata MD [facility] [phone] [id number] Your Appointments 8/8/2018 11:00 AM  
New Patient with Lana Spurling, MD  
[address] ([address]) Appt Note: NP est pcp ks 02/22/18 referred by Dr. Teddy Martino [facility] [address]  
[phone]  
  
   
 [facility] [address]  
  
    
 8/22/2018  2:00 PM  
ROUTINE CARE with Shaila Bedolla MD  
[address] [facility] [address] [place] [address]) Appt Note: 6 month follow up on seizures and sleep [place] [address] [facility] [id number]  
[phone]  
  
   
 [place] [address] [facility] [address] [facility] [address] Upcoming Health Maintenance Date Due DTaP/Tdap/Td series (2 - Td) 2/21/2028 Allergies as of 3/12/2018  Review Complete On: 3/12/2018 By: Abby Molina Severity Noted Reaction Type Reactions Aspirin  06/19/2011    Hives Current Immunizations  Reviewed on 2/21/2018 Name Date Pneumococcal Polysaccharide (PPSV-23) 2/21/2018 TD Vaccine 6/19/2011 11:16 PM  
 Tdap 2/21/2018 Not reviewed this visit You Were Diagnosed With   
  
 Codes Comments [facility]    -  Primary ICD-10-CM: R56.9 ICD-9-CM: 780.39 Vitals BP Pulse Height(growth percentile) Weight(growth percentile) SpO2 BMI  
 118/78 67 5' 8\" (1.727 m) 173 lb 9.6 oz (78.7 kg) 99% 26.4 kg/m2 Smoking Status Current Some Day Smoker BMI and BSA Data Body Mass Index Body Surface Area  
 26.4 kg/m 2 1.94 m 2 Preferred Pharmacy Pharmacy Name Phone [address] [phone] Your Updated Medication List  
  
   
This list is accurate as of 3/12/18  9:33 AM.  Always use your most recent med list.  
  
  
  
  
 acetaminophen 325 mg tablet Commonly known as:  TYLENOL Take 2 Tabs by mouth every four (4) hours as needed for Pain. Chlorpheniramine-HYDROcodone 5-4 mg Cp12 Take  by mouth. divalproex  mg tablet Commonly known as:  DEPAKOTE  
TAKE TWO TABLETS BY MOUTH EVERY EVENING  
  
 ergocalciferol 50,000 unit capsule Commonly known as:  ERGOCALCIFEROL Take 1 Cap by mouth every seven (7) days. fluticasone 50 mcg/actuation nasal spray Commonly known as:  Sharon Speller 2 Sprays by Both Nostrils route daily. gabapentin 300 mg capsule Commonly known as:  NEURONTIN Take 1 Cap by mouth three (3) times daily. guaiFENesin 100 mg/5 mL liquid Commonly known as:  ROBITUSSIN Take 10 mL by mouth three (3) times daily as needed for Cough. levETIRAcetam 750 mg tablet Commonly known as:  KEPPRA Take 1 Tab by mouth two (2) times a day. loratadine 10 mg tablet Commonly known as:  Rollene Ranks Take 1 Tab by mouth daily. tiZANidine 4 mg tablet Commonly known as:  Brigida Mew Take 1 Tab by mouth nightly. Prescriptions Sent to Pharmacy Refills  
 levETIRAcetam (KEPPRA) 750 mg tablet 2 Sig: Take 1 Tab by mouth two (2) times a day. Class: Normal  
 Pharmacy: [address] Ph #: [phone] Route: Oral  
  
To-Do List   
 03/12/2018 Neurology:  EEG Patient Instructions 1. Increase Keppra to 750 mg p.o. twice daily 2. EEG 3. Follow-up in 3 months A Healthy Lifestyle: Care Instructions Your Care Instructions A healthy lifestyle can help you feel good, stay at a healthy weight, and have plenty of energy for both work and play. A healthy lifestyle is something you can share with your whole family. A healthy lifestyle also can lower your risk for serious health problems, such as high blood pressure, heart disease, and diabetes. You can follow a few steps listed below to improve your health and the health of your family. Follow-up care is a key part of your treatment and safety. Be sure to make and go to all appointments, and call your doctor if you are having problems. It's also a good idea to know your test results and keep a list of the medicines you take. How can you care for yourself at home? · Do not eat too much sugar, fat, or fast foods. You can still have dessert and treats now and then. The goal is moderation. · Start small to improve your eating habits. Pay attention to portion sizes, drink less juice and soda pop, and eat more fruits and vegetables. ¨ Eat a healthy amount of food. A 3-ounce serving of meat, for example, is about the size of a deck of cards. Fill the rest of your plate with vegetables and whole grains. ¨ Limit the amount of soda and sports drinks you have every day. Drink more water when you are thirsty. ¨ Eat at least 5 servings of fruits and vegetables every day. It may seem like a lot, but it is not hard to reach this goal. A serving or helping is 1 piece of fruit, 1 cup of vegetables, or 2 cups of leafy, raw vegetables. Have an apple or some carrot sticks as an afternoon snack instead of a candy bar. Try to have fruits and/or vegetables at every meal. 
· Make exercise part of your daily routine. You may want to start with simple activities, such as walking, bicycling, or slow swimming. Try to be active 30 to 60 minutes every day. You do not need to do all 30 to 60 minutes all at once. For example, you can exercise 3 times a day for 10 or 20 minutes.  Moderate exercise is safe for most people, but it is always a good idea to talk to your doctor before starting an exercise program. 
· Keep moving. Michel  the lawn, work in the garden, or [employer]. Take the stairs instead of the elevator at work. · If you smoke, quit. People who smoke have an increased risk for heart attack, stroke, cancer, and other lung illnesses. Quitting is hard, but there are ways to boost your chance of quitting tobacco for good. ¨ Use nicotine gum, patches, or lozenges. ¨ Ask your doctor about stop-smoking programs and medicines. ¨ Keep trying. In addition to reducing your risk of diseases in the future, you will notice some benefits soon after you stop using tobacco. If you have shortness of breath or asthma symptoms, they will likely get better within a few weeks after you quit. · Limit how much alcohol you drink. Moderate amounts of alcohol (up to 2 drinks a day for men, 1 drink a day for women) are okay. But drinking too much can lead to liver problems, high blood pressure, and other health problems. Family health If you have a family, there are many things you can do together to improve your health. · Eat meals together as a family as often as possible. · Eat healthy foods. This includes fruits, vegetables, lean meats and dairy, and whole grains. · Include your family in your fitness plan. Most people think of activities such as jogging or tennis as the way to fitness, but there are many ways you and your family can be more active. Anything that makes you breathe hard and gets your heart pumping is exercise. Here are some tips: 
¨ Walk to do errands or to take your child to school or the bus. ¨ Go for a family bike ride after dinner instead of watching TV. Where can you learn more? Go to http://emilia-johnny.info/. Enter G832 in the search box to learn more about \"A Healthy Lifestyle: Care Instructions. \" Current as of: May 12, 2017 Content Version: 11.4 © 8681-8982 Healthwise, Incorporated. Care instructions adapted under license by [employer] (which disclaims liability or warranty for this information). If you have questions about a medical condition or this instruction, always ask your healthcare professional. [address] any warranty or liability for your use of this information. Introducing [facility] [place] [facility]! Quynh Powell introduces Max-Viz patient portal. Now you can access parts of your medical record, email your doctor's office, and request medication refills online. 1. In your internet browser, go to [email or web address] 2. Click on the First Time User? Click Here link in the Sign In box. You will see the New Member Sign Up page. 3. Enter your Max-Viz Access Code exactly as it appears below. You will not need to use this code after youve completed the sign-up process. If you do not sign up before the expiration date, you must request a new code. · Max-Viz Access Code: [email or web address] Expires: 5/22/2018  3:31 PM 
 
4. Enter the last four digits of your Social Security Number (xxxx) and Date of Birth (mm/dd/yyyy) as indicated and click Submit. You will be taken to the next sign-up page. 5. Create a Max-Viz ID. This will be your Max-Viz login ID and cannot be changed, so think of one that is secure and easy to remember. 6. Create a Max-Viz password. You can change your password at any time. 7. Enter your Password Reset Question and Answer. This can be used at a later time if you forget your password. 8. Enter your e-mail address. You will receive e-mail notification when new information is available in [address] 9. Click Sign Up. You can now view and download portions of your medical record. 10. Click the Download Summary menu link to download a portable copy of your medical information.  
 
If you have questions, please visit the Frequently Asked Questions section of the [employer] Remember, [employer] is NOT to be used for urgent needs. For medical emergencies, dial 911. Now available from your iPhone and Android! Please provide this summary of care documentation to your next provider. Your primary care clinician is listed as Mary Dunbar. If you have any questions after today's visit, please call [phone].

## 2018-03-12 NOTE — LETTER
3/12/2018 10:27 AM 
 
Patient:    Cj Crocker YOB: 1985 Date of Visit:    3/12/2018 Dear Too Kurtz MD 
 
Thank you for referring Mr. Cj Crocker to me for evaluation/treatment. Below are the relevant portions of my assessment and plan of care. Neurology follow-up note 03/12/18 Chief Complaint Patient presents with  New Patient Epilepsy, Lupus, Aneurysm SUBJECTIVE Cj Crocker is a 34 y.o. male who presented to the neurology office for seizures. The patient seizures started at the age of 14 and he states that the seizures could be brought in because of stress and if he is not eating. He usually has more than one seizure a month. The last seizure was around 10 days ago. According to his fiancée, the patient starts staring and sometimes have jerking of his whole body. The patient is taking Keppra 500 mg p.o. twice daily. In the past he has had seizures where he stares off with generalized shaking. His eyes start to flutter and then rolled up. He is confused the whole day. No bladder or bowel incontinence. Current Outpatient Prescriptions Medication Sig  Chlorpheniramine-HYDROcodone 5-4 mg cp12 Take  by mouth.  levETIRAcetam (KEPPRA) 750 mg tablet Take 1 Tab by mouth two (2) times a day. No current facility-administered medications for this visit. Past Medical History:  
Diagnosis Date  Neurological disorder   
 seizures  Seizures ([address] History reviewed. No pertinent surgical history. Family History Problem Relation Age of Onset  Seizures Mother  Hypertension Mother  Asthma Mother Social History Substance Use Topics  Smoking status: Current Some Day Smoker Packs/day: 1.00  Smokeless tobacco: Never Used  Alcohol use No  
 
 
REVIEW OF SYSTEMS:  
A ten system review of constitutional, cardiovascular, respiratory, musculoskeletal, endocrine, skin, SHEENT, genitourinary, psychiatric and neurologic systems was obtained and is unremarkable except seizures EXAMINATION:  
Visit Vitals  /78  Pulse 67  Ht 5' 8\" (1.727 m)  Wt 173 lb 9.6 oz (78.7 kg)  SpO2 99%  BMI 26.4 kg/m2 General:  
General appearance: Pt is in no acute distress Distal pulses are preserved Neurological Examination:  
Mental Status: AAO x3. Speech is fluent. Follows commands, has normal fund of knowledge, attention, short term recall, comprehension and insight. Cranial Nerves: Visual fields are full. PERRL, Extraocular movements are full. Facial sensation intact. Facial movement intact. Hearing intact to conversation. Palate elevates symmetrically. Shoulder shrug symmetric. Tongue midline. Motor: Strength is 5/5 in all 4 ext. Sensation: Normal to light touch Coordination/Cerebellar: Intact to finger-nose-finger Skin: No significant bruising or lacerations. Laboratory review:  
Results for orders placed or performed in visit on 02/21/18 HIV 1/2 AG/AB, 4TH GENERATION,W RFLX CONFIRM Result Value Ref Range HIV SCREEN 4TH GENERATION WRFX Non Reactive Non Reactive T PALLIDUM SCREEN W/REFLEX Result Value Ref Range T. pallidum Abs Positive (A) Negative CHLAMYDIA/GC PCR Result Value Ref Range Chlamydia trachomatis, TERRY Negative Negative Neisseria gonorrhoeae, TERRY Negative Negative HEPATITIS C AB Result Value Ref Range Hep C Virus Ab <0.1 0.0 - 0.9 s/co ratio LIPID PANEL Result Value Ref Range Cholesterol, total 126 100 - 199 mg/dL Triglyceride 101 0 - 149 mg/dL HDL Cholesterol 65 >39 mg/dL VLDL, calculated 20 5 - 40 mg/dL LDL, calculated 41 0 - 99 mg/dL RPR Result Value Ref Range RPR Non Reactive Non Reactive T PALLIDUM IMMUNOBLOT Result Value Ref Range T. pallidum by Immunoblot Positive (A) Negative CVD REPORT Result Value Ref Range INTERPRETATION Note Imaging review: 
7/25/2016 MRI brain with and without contrast 
Diminished marrow signal in the upper cervical spine may be within normal variation due to red marrow replacement. Clinical correlation is recommended Documentation review: 
None Assessment/Plan: Lugenia Snellen is a 34 y.o. male who presented to the neurology office for management of seizures. Patient has been having seizures for a number of years. The patient is presently on Keppra 500 mg p.o. twice daily and continues to have breakthrough seizures quite often. Will increase the dosage of Keppra to 750 mg p.o. twice daily. Also will get an EEG. Follow-up in 3 months PHQ over the last two weeks 3/12/2018 Little interest or pleasure in doing things Several days Feeling down, depressed or hopeless Nearly every day Total Score PHQ 2 4 Trouble falling or staying asleep, or sleeping too much Nearly every day Feeling tired or having little energy Nearly every day Poor appetite or overeating Nearly every day Feeling bad about yourself - or that you are a failure or have let yourself or your family down Several days Trouble concentrating on things such as school, work, reading or watching TV More than half the days Moving or speaking so slowly that other people could have noticed; or the opposite being so fidgety that others notice Several days Thoughts of being better off dead, or hurting yourself in some way More than half the days PHQ 9 Score 19 How difficult have these problems made it for you to do your work, take care of your home and get along with others Somewhat difficult Primary care to address possible depression if PHQ-9 score is more than 9. ICD-10-CM ICD-9-CM 1. Seizure ([facility] [address] R56.9 780.39 levETIRAcetam (KEPPRA) 750 mg tablet EEG Over 40 minutes was spent with the patient and family of which > 50% of the visit was spent counseling on diagnosis, management, and treatment of the diagnosis Darrell Jacques MD 
Neurologist 
 
CC: Mohit Dsouza MD 
Fax: [phone] This note was created using voice recognition software. Despite editing, there may be syntax errors. If you have questions, please do not hesitate to call me. I look forward to following Mr. Del [place] Hopping along with you. Sincerely, Darrell Jacques MD

## 2018-03-12 NOTE — PROGRESS NOTES
Neurology follow-up note    03/12/18    Chief Complaint   Patient presents with    New Patient     Epilepsy, Lupus, Aneurysm       SUBJECTIVE  Chun Nevarez is a 34 y.o. male who presented to the neurology office for seizures. The patient seizures started at the age of 14 and he states that the seizures could be brought in because of stress and if he is not eating. He usually has more than one seizure a month. The last seizure was around 10 days ago. According to his fiancée, the patient starts staring and sometimes have jerking of his whole body. The patient is taking Keppra 500 mg p.o. twice daily. In the past he has had seizures where he stares off with generalized shaking. His eyes start to flutter and then rolled up. He is confused the whole day. No bladder or bowel incontinence. Current Outpatient Prescriptions   Medication Sig    Chlorpheniramine-HYDROcodone 5-4 mg cp12 Take  by mouth.  levETIRAcetam (KEPPRA) 750 mg tablet Take 1 Tab by mouth two (2) times a day. No current facility-administered medications for this visit. Past Medical History:   Diagnosis Date    Neurological disorder     seizures    Seizures ([address]      History reviewed. No pertinent surgical history.   Family History   Problem Relation Age of Onset    Seizures Mother     Hypertension Mother     Asthma Mother      Social History   Substance Use Topics    Smoking status: Current Some Day Smoker     Packs/day: 1.00    Smokeless tobacco: Never Used    Alcohol use No       REVIEW OF SYSTEMS:   A ten system review of constitutional, cardiovascular, respiratory, musculoskeletal, endocrine, skin, SHEENT, genitourinary, psychiatric and neurologic systems was obtained and is unremarkable except seizures    EXAMINATION:   Visit Vitals    /78    Pulse 67    Ht 5' 8\" (1.727 m)    Wt 173 lb 9.6 oz (78.7 kg)    SpO2 99%    BMI 26.4 kg/m2        General:   General appearance: Pt is in no acute distress Distal pulses are preserved    Neurological Examination:   Mental Status: AAO x3. Speech is fluent. Follows commands, has normal fund of knowledge, attention, short term recall, comprehension and insight. Cranial Nerves: Visual fields are full. PERRL, Extraocular movements are full. Facial sensation intact. Facial movement intact. Hearing intact to conversation. Palate elevates symmetrically. Shoulder shrug symmetric. Tongue midline. Motor: Strength is 5/5 in all 4 ext. Sensation: Normal to light touch    Coordination/Cerebellar: Intact to finger-nose-finger     Skin: No significant bruising or lacerations. Laboratory review:   Results for orders placed or performed in visit on 02/21/18   HIV 1/2 AG/AB, 4TH GENERATION,W RFLX CONFIRM   Result Value Ref Range    HIV SCREEN 4TH GENERATION WRFX Non Reactive Non Reactive   T PALLIDUM SCREEN W/REFLEX   Result Value Ref Range    T. pallidum Abs Positive (A) Negative   CHLAMYDIA/GC PCR   Result Value Ref Range    Chlamydia trachomatis, TERRY Negative Negative    Neisseria gonorrhoeae, TERRY Negative Negative   HEPATITIS C AB   Result Value Ref Range    Hep C Virus Ab <0.1 0.0 - 0.9 s/co ratio   LIPID PANEL   Result Value Ref Range    Cholesterol, total 126 100 - 199 mg/dL    Triglyceride 101 0 - 149 mg/dL    HDL Cholesterol 65 >39 mg/dL    VLDL, calculated 20 5 - 40 mg/dL    LDL, calculated 41 0 - 99 mg/dL   RPR   Result Value Ref Range    RPR Non Reactive Non Reactive   T PALLIDUM IMMUNOBLOT   Result Value Ref Range    T. pallidum by Immunoblot Positive (A) Negative   CVD REPORT   Result Value Ref Range    INTERPRETATION Note        Imaging review:  7/25/2016  MRI brain with and without contrast  Diminished marrow signal in the upper cervical spine may be within normal variation due to red marrow replacement.   Clinical correlation is recommended    Documentation review:  None    Assessment/Plan:   Lugenia Snellen is a 34 y.o. male who presented to the neurology office for management of seizures. Patient has been having seizures for a number of years. The patient is presently on Keppra 500 mg p.o. twice daily and continues to have breakthrough seizures quite often. Will increase the dosage of Keppra to 750 mg p.o. twice daily. Also will get an EEG. Follow-up in 3 months    PHQ over the last two weeks 3/12/2018   Little interest or pleasure in doing things Several days   Feeling down, depressed or hopeless Nearly every day   Total Score PHQ 2 4   Trouble falling or staying asleep, or sleeping too much Nearly every day   Feeling tired or having little energy Nearly every day   Poor appetite or overeating Nearly every day   Feeling bad about yourself - or that you are a failure or have let yourself or your family down Several days   Trouble concentrating on things such as school, work, reading or watching TV More than half the days   Moving or speaking so slowly that other people could have noticed; or the opposite being so fidgety that others notice Several days   Thoughts of being better off dead, or hurting yourself in some way More than half the days   PHQ 9 Score 19   How difficult have these problems made it for you to do your work, take care of your home and get along with others Somewhat difficult     Primary care to address possible depression if PHQ-9 score is more than 9. ICD-10-CM ICD-9-CM    1. Seiz[facility] ([facility] R56.9 780.39 levETIRAcetam (KEPPRA) 750 mg tablet      EEG      Over 40 minutes was spent with the patient and family of which > 50% of the visit was spent counseling on diagnosis, management, and treatment of the diagnosis      Gagandeep West MD  Neurologist    CC: Nadiya Little MD  Fax: [phone]    This note was created using voice recognition software. Despite editing, there may be syntax errors.

## 2018-06-12 ENCOUNTER — OFFICE VISIT (OUTPATIENT)
Dept: NEUROLOGY | Age: 33
End: 2018-06-12

## 2018-06-12 VITALS
DIASTOLIC BLOOD PRESSURE: 72 MMHG | HEIGHT: 68 IN | SYSTOLIC BLOOD PRESSURE: 122 MMHG | HEART RATE: 85 BPM | WEIGHT: 166 LBS | OXYGEN SATURATION: 98 % | BODY MASS INDEX: 25.16 KG/M2

## 2018-06-12 DIAGNOSIS — R56.9 SEIZURE (HCC): ICD-10-CM

## 2018-06-12 RX ORDER — LEVETIRACETAM 750 MG/1
750 TABLET ORAL 2 TIMES DAILY
Qty: 60 TAB | Refills: 5 | Status: SHIPPED | OUTPATIENT
Start: 2018-06-12 | End: 2020-01-22 | Stop reason: SDUPTHER

## 2018-06-12 NOTE — MR AVS SNAPSHOT
[address], 
[id number], [address] [facility] 
[phone] Patient: Carina Merchant MRN: [id number] FIO:[id number] Visit Information Date & Time Provider Department Dept. Phone Encounter #  
 6/12/2018 11:40 AM Jenna Grey MD Neurology [facility] at [facility] [phone] [id number] Your Appointments 8/8/2018 11:00 AM  
New Patient with Porfirio Gaspar MD  
[address] ([facility]) Appt Note: NP est pcp ks 02/22/18 referred by Dr. Juan Sullivan [facility] [id number] [phone]  
  
   
 [address] [facility] [address]  
  
    
 8/22/2018  2:00 PM  
ROUTINE CARE with Loi Mcdonald MD  
[address] [facility]) Appt Note: 6 month follow up on seizures and sleep [place] [address] [facility] [address] [address]  
[phone]  
  
   
 [place] [address] [facility] [address] Upcoming Health Maintenance Date Due Influenza Age 5 to Adult 8/1/2018 DTaP/Tdap/Td series (2 - Td) 2/21/2028 Allergies as of 6/12/2018  Review Complete On: 6/12/2018 By: Jenna Grey MD  
  
 Severity Noted Reaction Type Reactions Aspirin  06/19/2011    Hives Current Immunizations  Reviewed on 2/21/2018 Name Date Pneumococcal Polysaccharide (PPSV-23) 2/21/2018 TD Vaccine 6/19/2011 11:16 PM  
 Tdap 2/21/2018 Not reviewed this visit You Were Diagnosed With   
  
 Codes Comments Seizure ([facility] [address]     ICD-10-CM: R56.9 ICD-9-CM: 780.39 Vitals BP Pulse Height(growth percentile) Weight(growth percentile) SpO2 BMI  
 122/72 85 5' 8\" (1.727 m) 166 lb (75.3 kg) 98% 25.24 kg/m2 Smoking Status Current Some Day Smoker BMI and BSA Data Body Mass Index Body Surface Area  
 25.24 kg/m 2 1.9 m 2 Preferred Pharmacy Pharmacy Name Phone [facility] [phone] Your Updated Medication List  
  
   
This list is accurate as of 6/12/18 12:18 PM.  Always use your most recent med list.  
  
  
  
  
 levETIRAcetam 750 mg tablet Commonly known as:  KEPPRA Take 1 Tab by mouth two (2) times a day. Prescriptions Sent to Pharmacy Refills  
 levETIRAcetam (KEPPRA) 750 mg tablet 5 Sig: Take 1 Tab by mouth two (2) times a day. Class: Normal  
 [facility] [phone] Route: Oral  
  
Patient Instructions 1. Follow-up in 6 months Office Policies Phone calls/patient messages: · Please allow up to 24 hours for someone in the office to contact you about your call or message. Be mindful your provider may be out of the office or your message may require further review. We encourage you to use HomeLight for your messages as this is a faster, more efficient way to communicate with our office Medication Refills: · Prescription medications require up to 48 business hours to process. We encourage you to use HomeLight for your refills. · For controlled medications: Please allow up to 72 business hours to process. Certain medications may require you to  a written prescription at our office. · NO narcotic/controlled medications will be prescribed after 4pm Monday through Friday or on weekends Form/Paperwork Completion: · Please note there is a $25 fee for all paperwork completed by our providers. We ask that you allow 7-14 business days. Pre-payment is due prior to picking up/faxing the completed form. You may also download your forms to HomeLight to have your doctor print off. A Healthy Lifestyle: Care Instructions Your Care Instructions A healthy lifestyle can help you feel good, stay at a healthy weight, and have plenty of energy for both work and play. A healthy lifestyle is something you can share with your whole family. A healthy lifestyle also can lower your risk for serious health problems, such as high blood pressure, heart disease, and diabetes. You can follow a few steps listed below to improve your health and the health of your family. Follow-up care is a key part of your treatment and safety. Be sure to make and go to all appointments, and call your doctor if you are having problems. It's also a good idea to know your test results and keep a list of the medicines you take. How can you care for yourself at home? · Do not eat too much sugar, fat, or fast foods. You can still have dessert and treats now and then. The goal is moderation. · Start small to improve your eating habits. Pay attention to portion sizes, drink less juice and soda pop, and eat more fruits and vegetables. ¨ Eat a healthy amount of food. A 3-ounce serving of meat, for example, is about the size of a deck of cards. Fill the rest of your plate with vegetables and whole grains. ¨ Limit the amount of soda and sports drinks you have every day. Drink more water when you are thirsty. ¨ Eat at least 5 servings of fruits and vegetables every day. It may seem like a lot, but it is not hard to reach this goal. A serving or helping is 1 piece of fruit, 1 cup of vegetables, or 2 cups of leafy, raw vegetables. Have an apple or some carrot sticks as an afternoon snack instead of a candy bar. Try to have fruits and/or vegetables at every meal. 
· Make exercise part of your daily routine. You may want to start with simple activities, such as walking, bicycling, or slow swimming. Try to be active 30 to 60 minutes every day. You do not need to do all 30 to 60 minutes all at once.  For example, you can exercise 3 times a day for 10 or 20 minutes. Moderate exercise is safe for most people, but it is always a good idea to talk to your doctor before starting an exercise program. 
· Keep moving. Jatinder Ornelasome the lawn, work in the garden, or RUN. Take the stairs instead of the elevator at work. · If you smoke, quit. People who smoke have an increased risk for heart attack, stroke, cancer, and other lung illnesses. Quitting is hard, but there are ways to boost your chance of quitting tobacco for good. ¨ Use nicotine gum, patches, or lozenges. ¨ Ask your doctor about stop-smoking programs and medicines. ¨ Keep trying. In addition to reducing your risk of diseases in the future, you will notice some benefits soon after you stop using tobacco. If you have shortness of breath or asthma symptoms, they will likely get better within a few weeks after you quit. · Limit how much alcohol you drink. Moderate amounts of alcohol (up to 2 drinks a day for men, 1 drink a day for women) are okay. But drinking too much can lead to liver problems, high blood pressure, and other health problems. Family health If you have a family, there are many things you can do together to improve your health. · Eat meals together as a family as often as possible. · Eat healthy foods. This includes fruits, vegetables, lean meats and dairy, and whole grains. · Include your family in your fitness plan. Most people think of activities such as jogging or tennis as the way to fitness, but there are many ways you and your family can be more active. Anything that makes you breathe hard and gets your heart pumping is exercise. Here are some tips: 
¨ Walk to do errands or to take your child to school or the bus. ¨ Go for a family bike ride after dinner instead of watching TV. Where can you learn more? Go to http://emilia-johnny.info/. Enter U315 in the search box to learn more about \"A Healthy Lifestyle: Care Instructions. \" Current as of: May 12, 2017 Content Version: 11.4 © 9494-4980 Healthwise, [place] [employer]. Care instructions adapted under license by [employer] (which disclaims liability or warranty for this information). If you have questions about a medical condition or this instruction, always ask your healthcare professional. [address] any warranty or liability for your use of this information. Introducing [facility] & HEALTH SERVICES! [facility] introduces GreenDust patient portal. Now you can access parts of your medical record, email your doctor's office, and request medication refills online. 1. In your internet browser, go to [email or web address] 2. Click on the First Time User? Click Here link in the Sign In box. You will see the New Member Sign Up page. 3. Enter your GreenDust Access Code exactly as it appears below. You will not need to use this code after youve completed the sign-up process. If you do not sign up before the expiration date, you must request a new code. · GreenDust Access Code: [email or web address] Expires: 9/10/2018 11:53 AM 
 
4. Enter the last four digits of your Social Security Number (xxxx) and Date of Birth (mm/dd/yyyy) as indicated and click Submit. You will be taken to the next sign-up page. 5. Create a 4-Tellt ID. This will be your GreenDust login ID and cannot be changed, so think of one that is secure and easy to remember. 6. Create a GreenDust password. You can change your password at any time. 7. Enter your Password Reset Question and Answer. This can be used at a later time if you forget your password. 8. Enter your e-mail address. You will receive e-mail notification when new information is available in [address] 9. Click Sign Up. You can now view and download portions of your medical record. 10. Click the Download Summary menu link to download a portable copy of your medical information. If you have questions, please visit the Frequently Asked Questions section of the [employer] website. Remember, [employer] is NOT to be used for urgent needs. For medical emergencies, dial 911. Now available from your iPhone and Android! Please provide this summary of care documentation to your next provider. Your primary care clinician is listed as Russel Orosco. If you have any questions after today's visit, please call [phone].

## 2018-06-12 NOTE — LETTER
6/12/2018 12:32 PM 
 
Patient:    Melissa Matta YOB: 1985 Date of Visit:    6/12/2018 Dear Clark Anna MD 
 
Thank you for referring Mr. Melissa Matta to me for evaluation/treatment. Below are the relevant portions of my assessment and plan of care. Neurology follow-up note 
 
06/12/18 Chief Complaint Patient presents with  Follow-up  Seizure SUBJECTIVE Melissa Matta is a 34 y.o. male who presented to the neurology office for seizures. The patient seizures started at the age of 14 and he states that the seizures could be brought in because of stress and if he is not eating. According to his fiancée, the patient starts staring and sometimes have jerking of his whole body. The patient was having one seizure every month and the patient was taking Keppra 500 mg p.o. twice daily and increase the dosage to 750 mg p.o. twice daily and he has not had any seizures in the last 2 months. In the past he has had seizures where he stares off with generalized shaking. His eyes start to flutter and then rolled up. He is confused the whole day. No bladder or bowel incontinence. Current Outpatient Prescriptions Medication Sig  levETIRAcetam (KEPPRA) 750 mg tablet Take 1 Tab by mouth two (2) times a day. No current facility-administered medications for this visit. Past Medical History:  
Diagnosis Date  Neurological disorder   
 seizures  Seizures ([address] History reviewed. No pertinent surgical history. Family History Problem Relation Age of Onset  Seizures Mother  Hypertension Mother  Asthma Mother Social History Substance Use Topics  Smoking status: Current Some Day Smoker Packs/day: 1.00  Smokeless tobacco: Never Used  Alcohol use No  
 
 
REVIEW OF SYSTEMS:  
A ten system review of constitutional, cardiovascular, respiratory, musculoskeletal, endocrine, skin, SHEENT, genitourinary, psychiatric and neurologic systems was obtained and is unremarkable except seizures EXAMINATION:  
Visit Vitals  /72  Pulse 85  
 Ht 5' 8\" (1.727 m)  Wt 166 lb (75.3 kg)  SpO2 98%  BMI 25.24 kg/m2 General:  
General appearance: Pt is in no acute distress Distal pulses are preserved Neurological Examination:  
Mental Status: AAO x3. Speech is fluent. Follows commands, has normal fund of knowledge, attention, short term recall, comprehension and insight. Cranial Nerves: Visual fields are full. PERRL, Extraocular movements are full. Facial sensation intact. Facial movement intact. Hearing intact to conversation. Palate elevates symmetrically. Shoulder shrug symmetric. Tongue midline. Motor: Strength is 5/5 in all 4 ext. Sensation: Normal to light touch Coordination/Cerebellar: Intact to finger-nose-finger Skin: No significant bruising or lacerations. Laboratory review:  
Results for orders placed or performed in visit on 02/21/18 HIV 1/2 AG/AB, 4TH GENERATION,W RFLX CONFIRM Result Value Ref Range HIV SCREEN 4TH GENERATION WRFX Non Reactive Non Reactive T PALLIDUM SCREEN W/REFLEX Result Value Ref Range T. pallidum Abs Positive (A) Negative CHLAMYDIA/GC PCR Result Value Ref Range Chlamydia trachomatis, TERRY Negative Negative Neisseria gonorrhoeae, TERRY Negative Negative HEPATITIS C AB Result Value Ref Range Hep C Virus Ab <0.1 0.0 - 0.9 s/co ratio LIPID PANEL Result Value Ref Range Cholesterol, total 126 100 - 199 mg/dL Triglyceride 101 0 - 149 mg/dL HDL Cholesterol 65 >39 mg/dL VLDL, calculated 20 5 - 40 mg/dL LDL, calculated 41 0 - 99 mg/dL RPR Result Value Ref Range RPR Non Reactive Non Reactive T PALLIDUM IMMUNOBLOT Result Value Ref Range T. pallidum by Immunoblot Positive (A) Negative CVD REPORT Result Value Ref Range INTERPRETATION Note Imaging review: 
7/25/2016 MRI brain with and without contrast 
Diminished marrow signal in the upper cervical spine may be within normal variation due to red marrow replacement. Clinical correlation is recommended Documentation review: 
None Assessment/Plan: Genia Tarango is a 34 y.o. male who presented to the neurology office for management of seizures. Patient has been having seizures for a number of years. The patient is presently on Keppra 750 mg mg p.o. twice daily and his seizures are better controlled. Continue on the present dosage Follow-up in 6 months PHQ over the last two weeks 3/12/2018 Little interest or pleasure in doing things Several days Feeling down, depressed or hopeless Nearly every day Total Score PHQ 2 4 Trouble falling or staying asleep, or sleeping too much Nearly every day Feeling tired or having little energy Nearly every day Poor appetite or overeating Nearly every day Feeling bad about yourself - or that you are a failure or have let yourself or your family down Several days Trouble concentrating on things such as school, work, reading or watching TV More than half the days Moving or speaking so slowly that other people could have noticed; or the opposite being so fidgety that others notice Several days Thoughts of being better off dead, or hurting yourself in some way More than half the days PHQ 9 Score 19 How difficult have these problems made it for you to do your work, take care of your home and get along with others Somewhat difficult Primary care to address possible depression if PHQ-9 score is more than 9. ICD-10-CM ICD-9-CM 1. [facility] R56.9 780.39 levETIRAcetam (KEPPRA) 750 mg tablet Over 25 minutes was spent with the patient and family of which > 50% of the visit was spent counseling on diagnosis, management, and treatment of the diagnosis Deandre Cook MD 
Neurologist 
 
CC: Judi Hernandes MD 
Fax: [phone] This note was created using voice recognition software. Despite editing, there may be syntax errors. If you have questions, please do not hesitate to call me. I look forward to following Mr. Natasha Lafleur along with you. Sincerely, Nancy Mao MD

## 2018-06-12 NOTE — PROGRESS NOTES
Neurology follow-up note    06/12/18    Chief Complaint   Patient presents with    Follow-up    Seizure       SUBJECTIVE  Boy Martini is a 34 y.o. male who presented to the neurology office for seizures. The patient seizures started at the age of 14 and he states that the seizures could be brought in because of stress and if he is not eating. According to his fiancée, the patient starts staring and sometimes have jerking of his whole body. The patient was having one seizure every month and the patient was taking Keppra 500 mg p.o. twice daily and increase the dosage to 750 mg p.o. twice daily and he has not had any seizures in the last 2 months. In the past he has had seizures where he stares off with generalized shaking. His eyes start to flutter and then rolled up. He is confused the whole day. No bladder or bowel incontinence. Current Outpatient Prescriptions   Medication Sig    levETIRAcetam (KEPPRA) 750 mg tablet Take 1 Tab by mouth two (2) times a day. No current facility-administered medications for this visit. Past Medical History:   Diagnosis Date    Neurological disorder     seizures    Seizures ([address]      History reviewed. No pertinent surgical history.   Family History   Problem Relation Age of Onset    Seizures Mother     Hypertension Mother     Asthma Mother      Social History   Substance Use Topics    Smoking status: Current Some Day Smoker     Packs/day: 1.00    Smokeless tobacco: Never Used    Alcohol use No       REVIEW OF SYSTEMS:   A ten system review of constitutional, cardiovascular, respiratory, musculoskeletal, endocrine, skin, SHEENT, genitourinary, psychiatric and neurologic systems was obtained and is unremarkable except seizures    EXAMINATION:   Visit Vitals    /72    Pulse 85    Ht 5' 8\" (1.727 m)    Wt 166 lb (75.3 kg)    SpO2 98%    BMI 25.24 kg/m2        General:   General appearance: Pt is in no acute distress   Distal pulses are preserved    Neurological Examination:   Mental Status: AAO x3. Speech is fluent. Follows commands, has normal fund of knowledge, attention, short term recall, comprehension and insight. Cranial Nerves: Visual fields are full. PERRL, Extraocular movements are full. Facial sensation intact. Facial movement intact. Hearing intact to conversation. Palate elevates symmetrically. Shoulder shrug symmetric. Tongue midline. Motor: Strength is 5/5 in all 4 ext. Sensation: Normal to light touch    Coordination/Cerebellar: Intact to finger-nose-finger     Skin: No significant bruising or lacerations. Laboratory review:   Results for orders placed or performed in visit on 02/21/18   HIV 1/2 AG/AB, 4TH GENERATION,W RFLX CONFIRM   Result Value Ref Range    HIV SCREEN 4TH GENERATION WRFX Non Reactive Non Reactive   T PALLIDUM SCREEN W/REFLEX   Result Value Ref Range    T. pallidum Abs Positive (A) Negative   CHLAMYDIA/GC PCR   Result Value Ref Range    Chlamydia trachomatis, TERRY Negative Negative    Neisseria gonorrhoeae, TERRY Negative Negative   HEPATITIS C AB   Result Value Ref Range    Hep C Virus Ab <0.1 0.0 - 0.9 s/co ratio   LIPID PANEL   Result Value Ref Range    Cholesterol, total 126 100 - 199 mg/dL    Triglyceride 101 0 - 149 mg/dL    HDL Cholesterol 65 >39 mg/dL    VLDL, calculated 20 5 - 40 mg/dL    LDL, calculated 41 0 - 99 mg/dL   RPR   Result Value Ref Range    RPR Non Reactive Non Reactive   T PALLIDUM IMMUNOBLOT   Result Value Ref Range    T. pallidum by Immunoblot Positive (A) Negative   CVD REPORT   Result Value Ref Range    INTERPRETATION Note        Imaging review:  7/25/2016  MRI brain with and without contrast  Diminished marrow signal in the upper cervical spine may be within normal variation due to red marrow replacement.   Clinical correlation is recommended    Documentation review:  None    Assessment/Plan:   Dana [facility] is a 34 y.o. male who presented to the neurology office for management of seizures. Patient has been having seizures for a number of years. The patient is presently on Keppra 750 mg mg p.o. twice daily and his seizures are better controlled. Continue on the present dosage    Follow-up in 6 months    PHQ over the last two weeks 3/12/2018   Little interest or pleasure in doing things Several days   Feeling down, depressed or hopeless Nearly every day   Total Score PHQ 2 4   Trouble falling or staying asleep, or sleeping too much Nearly every day   Feeling tired or having little energy Nearly every day   Poor appetite or overeating Nearly every day   Feeling bad about yourself - or that you are a failure or have let yourself or your family down Several days   Trouble concentrating on things such as school, work, reading or watching TV More than half the days   Moving or speaking so slowly that other people could have noticed; or the opposite being so fidgety that others notice Several days   Thoughts of being better off dead, or hurting yourself in some way More than half the days   PHQ 9 Score 19   How difficult have these problems made it for you to do your work, take care of your home and get along with others Somewhat difficult     Primary care to address possible depression if PHQ-9 score is more than 9. ICD-10-CM ICD-9-CM    1. [facility] R56.9 780.39 levETIRAcetam (KEPPRA) 750 mg tablet      Over 25 minutes was spent with the patient and family of which > 50% of the visit was spent counseling on diagnosis, management, and treatment of the diagnosis      Erika Weller MD  Neurologist    CC: Jenna Ayers MD  Fax: [phone]    This note was created using voice recognition software. Despite editing, there may be syntax errors.

## 2018-06-12 NOTE — PATIENT INSTRUCTIONS
1.  Follow-up in 6 months    Office Policies        Phone calls/patient messages:    · Please allow up to 24 hours for someone in the office to contact you about your call or message. Be mindful your provider may be out of the office or your message may require further review. We encourage you to use [employer] for your messages as this is a faster, more efficient way to communicate with our office           Medication Refills:    · Prescription medications require up to 48 business hours to process. We encourage you to use [employer] for your refills. · For controlled medications: Please allow up to 72 business hours to process. Certain medications may require you to  a written prescription at our office. · NO narcotic/controlled medications will be prescribed after 4pm Monday through Friday or on weekends              Form/Paperwork Completion:    · Please note there is a $25 fee for all paperwork completed by our providers. We ask that you allow 7-14 business days. Pre-payment is due prior to picking up/faxing the completed form. You may also download your forms to [employer] to have your doctor print off. A Healthy Lifestyle: Care Instructions  Your Care Instructions    A healthy lifestyle can help you feel good, stay at a healthy weight, and have plenty of energy for both work and play. A healthy lifestyle is something you can share with your whole family. A healthy lifestyle also can lower your risk for serious health problems, such as high blood pressure, heart disease, and diabetes. You can follow a few steps listed below to improve your health and the health of your family. Follow-up care is a key part of your treatment and safety. Be sure to make and go to all appointments, and call your doctor if you are having problems. It's also a good idea to know your test results and keep a list of the medicines you take. How can you care for yourself at home?   · Do not eat too much sugar, fat, or fast foods. You can still have dessert and treats now and then. The goal is moderation. · Start small to improve your eating habits. Pay attention to portion sizes, drink less juice and soda pop, and eat more fruits and vegetables. ¨ Eat a healthy amount of food. A 3-ounce serving of meat, for example, is about the size of a deck of cards. Fill the rest of your plate with vegetables and whole grains. ¨ Limit the amount of soda and sports drinks you have every day. Drink more water when you are thirsty. ¨ Eat at least 5 servings of fruits and vegetables every day. It may seem like a lot, but it is not hard to reach this goal. A serving or helping is 1 piece of fruit, 1 cup of vegetables, or 2 cups of leafy, raw vegetables. Have an apple or some carrot sticks as an afternoon snack instead of a candy bar. Try to have fruits and/or vegetables at every meal.  · Make exercise part of your daily routine. You may want to start with simple activities, such as walking, bicycling, or slow swimming. Try to be active 30 to 60 minutes every day. You do not need to do all 30 to 60 minutes all at once. For example, you can exercise 3 times a day for 10 or 20 minutes. Moderate exercise is safe for most people, but it is always a good idea to talk to your doctor before starting an exercise program.  · Keep moving. Dustytonny Rooneyr the lawn, work in the garden, or [employer]. Take the stairs instead of the elevator at work. · If you smoke, quit. People who smoke have an increased risk for heart attack, stroke, cancer, and other lung illnesses. Quitting is hard, but there are ways to boost your chance of quitting tobacco for good. ¨ Use nicotine gum, patches, or lozenges. ¨ Ask your doctor about stop-smoking programs and medicines. ¨ Keep trying.   In addition to reducing your risk of diseases in the future, you will notice some benefits soon after you stop using tobacco. If you have shortness of breath or asthma symptoms, they will likely get better within a few weeks after you quit. · Limit how much alcohol you drink. Moderate amounts of alcohol (up to 2 drinks a day for men, 1 drink a day for women) are okay. But drinking too much can lead to liver problems, high blood pressure, and other health problems. Family health  If you have a family, there are many things you can do together to improve your health. · Eat meals together as a family as often as possible. · Eat healthy foods. This includes fruits, vegetables, lean meats and dairy, and whole grains. · Include your family in your fitness plan. Most people think of activities such as jogging or tennis as the way to fitness, but there are many ways you and your family can be more active. Anything that makes you breathe hard and gets your heart pumping is exercise. Here are some tips:  ¨ Walk to do errands or to take your child to school or the bus. ¨ Go for a family bike ride after dinner instead of watching TV. Where can you learn more? Go to http://emilia-johnny.info/. Enter Z728 in the search box to learn more about \"A Healthy Lifestyle: Care Instructions. \"  Current as of: May 12, 2017  Content Version: 11.4  © 5407-8814 [employer]. Care instructions adapted under license by [employer] (which disclaims liability or warranty for this information). If you have questions about a medical condition or this instruction, always ask your healthcare professional. [place] [address] any warranty or liability for your use of this information.

## 2018-08-22 ENCOUNTER — OFFICE VISIT (OUTPATIENT)
Dept: INTERNAL MEDICINE CLINIC | Age: 33
End: 2018-08-22

## 2018-08-22 VITALS
BODY MASS INDEX: 24.93 KG/M2 | HEIGHT: 68 IN | TEMPERATURE: 98.6 F | SYSTOLIC BLOOD PRESSURE: 112 MMHG | WEIGHT: 164.5 LBS | DIASTOLIC BLOOD PRESSURE: 71 MMHG | HEART RATE: 85 BPM | RESPIRATION RATE: 18 BRPM | OXYGEN SATURATION: 97 %

## 2018-08-22 DIAGNOSIS — G40.909 SEIZURE DISORDER (HCC): Primary | ICD-10-CM

## 2018-08-22 DIAGNOSIS — G43.809 OTHER MIGRAINE WITHOUT STATUS MIGRAINOSUS, NOT INTRACTABLE: ICD-10-CM

## 2018-08-22 DIAGNOSIS — F51.02 ADJUSTMENT INSOMNIA: ICD-10-CM

## 2018-08-22 DIAGNOSIS — M32.8 OTHER FORMS OF SYSTEMIC LUPUS ERYTHEMATOSUS, UNSPECIFIED ORGAN INVOLVEMENT STATUS (HCC): ICD-10-CM

## 2018-08-22 PROBLEM — A53.9 SYPHILIS: Status: ACTIVE | Noted: 2018-08-22

## 2018-08-22 NOTE — PATIENT INSTRUCTIONS
Stopping Smoking: Care Instructions  Your Care Instructions  Cigarette smokers crave the nicotine in cigarettes. Giving it up is much harder than simply changing a habit. Your body has to stop craving the nicotine. It is hard to quit, but you can do it. There are many tools that people use to quit smoking. You may find that combining tools works best for you. There are several steps to quitting. First you get ready to quit. Then you get support to help you. After that, you learn new skills and behaviors to become a nonsmoker. For many people, a necessary step is getting and using medicine. Your doctor will help you set up the plan that best meets your needs. You may want to attend a smoking cessation program to help you quit smoking. When you choose a program, look for one that has proven success. Ask your doctor for ideas. You will greatly increase your chances of success if you take medicine as well as get counseling or join a cessation program.  Some of the changes you feel when you first quit tobacco are uncomfortable. Your body will miss the nicotine at first, and you may feel short-tempered and grumpy. You may have trouble sleeping or concentrating. Medicine can help you deal with these symptoms. You may struggle with changing your smoking habits and rituals. The last step is the tricky one: Be prepared for the smoking urge to continue for a time. This is a lot to deal with, but keep at it. You will feel better. Follow-up care is a key part of your treatment and safety. Be sure to make and go to all appointments, and call your doctor if you are having problems. It's also a good idea to know your test results and keep a list of the medicines you take. How can you care for yourself at home? · Ask your family, friends, and coworkers for support. You have a better chance of quitting if you have help and support.   · Join a support group, such as Nicotine Anonymous, for people who are trying to quit smoking. · Consider signing up for a smoking cessation program, such as the American Lung Association's Freedom from Smoking program.  · Get text messaging support. Go to the website at www.smokefree. gov to sign up for the [facility] program.  · Set a quit date. Pick your date carefully so that it is not right in the middle of a big deadline or stressful time. Once you quit, do not even take a puff. Get rid of all ashtrays and lighters after your last cigarette. Clean your house and your clothes so that they do not smell of smoke. · Learn how to be a nonsmoker. Think about ways you can avoid those things that make you reach for a cigarette. ¨ Avoid situations that put you at greatest risk for smoking. For some people, it is hard to have a drink with friends without smoking. For others, they might skip a coffee break with coworkers who smoke. ¨ Change your daily routine. Take a different route to work or eat a meal in a different place. · Cut down on stress. Calm yourself or release tension by doing an activity you enjoy, such as reading a book, taking a hot bath, or gardening. · Talk to your doctor or pharmacist about nicotine replacement therapy, which replaces the nicotine in your body. You still get nicotine but you do not use tobacco. Nicotine replacement products help you slowly reduce the amount of nicotine you need. These products come in several forms, many of them available over-the-counter:  ¨ Nicotine patches  ¨ Nicotine gum and lozenges  ¨ Nicotine inhaler  · Ask your doctor about bupropion (Wellbutrin) or varenicline (Chantix), which are prescription medicines. They do not contain nicotine. They help you by reducing withdrawal symptoms, such as stress and anxiety. · Some people find hypnosis, acupuncture, and massage helpful for ending the smoking habit. · Eat a healthy diet and get regular exercise. Having healthy habits will help your body move past its craving for nicotine.   · Be prepared to keep trying. Most people are not successful the first few times they try to quit. Do not get mad at yourself if you smoke again. Make a list of things you learned and think about when you want to try again, such as next week, next month, or next year. Where can you learn more? Go to http://emilia-johnny.info/. Enter N579 in the search box to learn more about \"Stopping Smoking: Care Instructions. \"  Current as of: November 29, 2017  Content Version: 11.7  © 2968-4716 [employer]. Care instructions adapted under license by [employer] (which disclaims liability or warranty for this information). If you have questions about a medical condition or this instruction, always ask your healthcare professional. [address] any warranty or liability for your use of this information. Insomnia: Care Instructions  Your Care Instructions    Insomnia is the inability to sleep well. It is a common problem for most people at some time. Insomnia may make it hard for you to get to sleep, stay asleep, or sleep as long as you need to. This can make you tired and grouchy during the day. It can also make you forgetful, less effective at work, and unhappy. Insomnia can be caused by conditions such as depression or anxiety. Pain can also affect your ability to sleep. When these problems are solved, the insomnia usually clears up. But sometimes bad sleep habits can cause insomnia. If insomnia is affecting your work or your enjoyment of life, you can take steps to improve your sleep. Follow-up care is a key part of your treatment and safety. Be sure to make and go to all appointments, and call your doctor if you are having problems. It's also a good idea to know your test results and keep a list of the medicines you take. How can you care for yourself at home?   What to avoid  · Do not have drinks with caffeine, such as coffee or black tea, for 8 hours before bed.  · Do not smoke or use other types of tobacco near bedtime. Nicotine is a stimulant and can keep you awake. · Avoid drinking alcohol late in the evening, because it can cause you to wake in the middle of the night. · Do not eat a big meal close to bedtime. If you are hungry, eat a light snack. · Do not drink a lot of water close to bedtime, because the need to urinate may wake you up during the night. · Do not read or watch TV in bed. Use the bed only for sleeping and sexual activity. What to try  · Go to bed at the same time every night, and wake up at the same time every morning. Do not take naps during the day. · Keep your bedroom quiet, dark, and cool. · Sleep on a comfortable pillow and mattress. · If watching the clock makes you anxious, turn it facing away from you so you cannot see the time. · If you worry when you lie down, start a worry book. Well before bedtime, write down your worries, and then set the book and your concerns aside. · Try meditation or other relaxation techniques before you go to bed. · If you cannot fall asleep, get up and go to another room until you feel sleepy. Do something relaxing. Repeat your bedtime routine before you go to bed again. · Make your house quiet and calm about an hour before bedtime. Turn down the lights, turn off the TV, log off the computer, and turn down the volume on music. This can help you relax after a busy day. When should you call for help? Watch closely for changes in your health, and be sure to contact your doctor if:    · Your efforts to improve your sleep do not work.     · Your insomnia gets worse.     · You have been feeling down, depressed, or hopeless or have lost interest in things that you usually enjoy. Where can you learn more? Go to http://emilia-johnny.info/. Enter P513 in the search box to learn more about \"Insomnia: Care Instructions. \"  Current as of: October 10, 2017  Content Version: 11.7  © 2793-8648 [employer]. Care instructions adapted under license by [employer] (which disclaims liability or warranty for this information). If you have questions about a medical condition or this instruction, always ask your healthcare professional. [place] [address] any warranty or liability for your use of this information.

## 2018-08-22 NOTE — PROGRESS NOTES
Chief Complaint   Patient presents with    Seizure       1. Have you been to the ER, urgent care clinic since your last visit? Hospitalized since your last visit? No    2. Have you seen or consulted any other health care providers outside of the [address] [place] [address] since your last visit? Include any pap smears or colon screening.  Yes When: Neurology

## 2018-08-22 NOTE — PROGRESS NOTES
Danika Naqvi is a 28 y.o. male and presents with Seizure  . Subjective:  Accompanied by his counselor. Since last visit, pt has seen neuro and is stable on current regimen. He missed his rheum appt and it has been rescheduled for DECEMBER  Pt relays he has been going through life stressors and has been having difficulty falling and staying aslepp x 6 weeks. PMH-  Seizure d/o stable on keppra  SLE-was on MTX and prednisone given by neuro as per pt   -pt relays his sxs of SLE are myalgias  Migraine headaches-quiescent  Illiteracy      Review of Systems  Constitutional: negative for fevers, chills, anorexia and weight loss  Respiratory:  negative for cough, hemoptysis, dyspnea,wheezing  CV:   negative for chest pain, palpitations, lower extremity edema  GI:   negative for nausea, vomiting, diarrhea, abdominal pain,melena  Musculoskel: positive for myalgias, arthralgias, back pain, joint pain  Neurological:  positive for headaches, dizziness, vertigo, memory problems   Behavl/Psych: negative for feelings of anxiety, depression, mood changes    Past Medical History:   Diagnosis Date    Neurological disorder     seizures    Seizures ([facility] [address]      No past surgical history on file.   Social History     Social History    Marital status: SINGLE     Spouse name: N/A    Number of children: N/A    Years of education: N/A     Social History Main Topics    Smoking status: Current Some Day Smoker     Packs/day: 1.00    Smokeless tobacco: Never Used    Alcohol use No    Drug use: Yes     Special: Marijuana      Comment: occasion    Sexual activity: No     Other Topics Concern    Not on file     Social History Narrative    ** Merged History Encounter **          Family History   Problem Relation Age of Onset    Seizures Mother     Hypertension Mother     Asthma Mother      Current Outpatient Prescriptions   Medication Sig Dispense Refill    levETIRAcetam (KEPPRA) 750 mg tablet Take 1 Tab by mouth two (2) times a day. 60 Tab 5     Allergies   Allergen Reactions    Aspirin Hives       Objective:  Visit Vitals    /71 (BP 1 Location: Left arm, BP Patient Position: Sitting)    Pulse 85    Temp 98.6 °F (37 °C) (Oral)    Resp 18    Ht 5' 8\" (1.727 m)    Wt 164 lb 8 oz (74.6 kg)    SpO2 97%    BMI 25.01 kg/m2     Physical Exam:   General appearance - alert, well appearing, and in no distress  Mental status - alert, oriented to person, place, and time  EYE-EOMI  ENT-ENT exam normal, no neck nodes or sinus tenderness  Mouth - mucous membranes moist, pharynx normal without lesions  Neck - supple, no significant adenopathy   Chest - clear to auscultation, no wheezes, rales or rhonchi, symmetric air entry   Heart - normal rate, regular rhythm, normal S1, S2  Abdomen - soft, nontender, nondistended, no masses or organomegaly  Ext-peripheral pulses normal, no pedal edema, no clubbing or cyanosis  Skin-Warm and dry.  no hyperpigmentation, vitiligo, or suspicious lesions  Neuro -alert, oriented, normal speech, no focal findings or movement disorder noted      Results for orders placed or performed in visit on 02/21/18   HIV 1/2 AG/AB, 4TH GENERATION,W RFLX CONFIRM   Result Value Ref Range    HIV SCREEN 4TH GENERATION WRFX Non Reactive Non Reactive   T PALLIDUM SCREEN W/REFLEX   Result Value Ref Range    T. pallidum Abs Positive (A) Negative   CHLAMYDIA/GC PCR   Result Value Ref Range    Chlamydia trachomatis, TERRY Negative Negative    Neisseria gonorrhoeae, TERRY Negative Negative   HEPATITIS C AB   Result Value Ref Range    Hep C Virus Ab <0.1 0.0 - 0.9 s/co ratio   LIPID PANEL   Result Value Ref Range    Cholesterol, total 126 100 - 199 mg/dL    Triglyceride 101 0 - 149 mg/dL    HDL Cholesterol 65 >39 mg/dL    VLDL, calculated 20 5 - 40 mg/dL    LDL, calculated 41 0 - 99 mg/dL   RPR   Result Value Ref Range    RPR Non Reactive Non Reactive   T PALLIDUM IMMUNOBLOT   Result Value Ref Range    T. pallidum by Immunoblot Positive (A) Negative   CVD REPORT   Result Value Ref Range    INTERPRETATION Note        Assessment/Plan:    ICD-10-CM ICD-9-CM    1. Seizure disorder ([facility] G40.909 345.90    2. Other forms of systemic lupus erythematosus, unspecified organ involvement status ([facility] [address] M32.8 710.0    3. Other migraine without status migrainosus, not intractable G43.809 346.80    4. Adjustment insomnia F51.02 307.41    1. Seizure disorder ([facility] [address]  Stable on current regimen  F/u neuro routine/prn    2. Other forms of systemic lupus erythematosus, unspecified organ involvement status ([facility] [address]  I question the dx as pt relays he has never been evaluated by rheum and was dx'ed and tx by neuro in the past  Refer to rheum for a formal evaluation    3. Other migraine without status migrainosus, not intractable  Currently quiescent    4. Adjustment insomnia  D/w pt behavioral modifications and recommend diphenhydramine or melatonin    No orders of the defined types were placed in this encounter. Patient Instructions          Stopping Smoking: Care Instructions  Your Care Instructions  Cigarette smokers crave the nicotine in cigarettes. Giving it up is much harder than simply changing a habit. Your body has to stop craving the nicotine. It is hard to quit, but you can do it. There are many tools that people use to quit smoking. You may find that combining tools works best for you. There are several steps to quitting. First you get ready to quit. Then you get support to help you. After that, you learn new skills and behaviors to become a nonsmoker. For many people, a necessary step is getting and using medicine. Your doctor will help you set up the plan that best meets your needs. You may want to attend a smoking cessation program to help you quit smoking. When you choose a program, look for one that has proven success. Ask your doctor for ideas.  You will greatly increase your chances of success if you take medicine as well as get counseling or join a cessation program.  Some of the changes you feel when you first quit tobacco are uncomfortable. Your body will miss the nicotine at first, and you may feel short-tempered and grumpy. You may have trouble sleeping or concentrating. Medicine can help you deal with these symptoms. You may struggle with changing your smoking habits and rituals. The last step is the tricky one: Be prepared for the smoking urge to continue for a time. This is a lot to deal with, but keep at it. You will feel better. Follow-up care is a key part of your treatment and safety. Be sure to make and go to all appointments, and call your doctor if you are having problems. It's also a good idea to know your test results and keep a list of the medicines you take. How can you care for yourself at home? · Ask your family, friends, and coworkers for support. You have a better chance of quitting if you have help and support. · Join a support group, such as Nicotine Anonymous, for people who are trying to quit smoking. · Consider signing up for a smoking cessation program, such as the American Lung Association's Freedom from Smoking program.  · Get text messaging support. Go to the website at www.smokefree. gov to sign up for the  program.  · Set a quit date. Pick your date carefully so that it is not right in the middle of a big deadline or stressful time. Once you quit, do not even take a puff. Get rid of all ashtrays and lighters after your last cigarette. Clean your house and your clothes so that they do not smell of smoke. · Learn how to be a nonsmoker. Think about ways you can avoid those things that make you reach for a cigarette. ¨ Avoid situations that put you at greatest risk for smoking. For some people, it is hard to have a drink with friends without smoking. For others, they might skip a coffee break with coworkers who smoke. ¨ Change your daily routine.  Take a different route to work or eat a meal in a different place.  · Cut down on stress. Calm yourself or release tension by doing an activity you enjoy, such as reading a book, taking a hot bath, or gardening. · Talk to your doctor or pharmacist about nicotine replacement therapy, which replaces the nicotine in your body. You still get nicotine but you do not use tobacco. Nicotine replacement products help you slowly reduce the amount of nicotine you need. These products come in several forms, many of them available over-the-counter:  ¨ Nicotine patches  ¨ Nicotine gum and lozenges  ¨ Nicotine inhaler  · Ask your doctor about bupropion (Wellbutrin) or varenicline (Chantix), which are prescription medicines. They do not contain nicotine. They help you by reducing withdrawal symptoms, such as stress and anxiety. · Some people find hypnosis, acupuncture, and massage helpful for ending the smoking habit. · Eat a healthy diet and get regular exercise. Having healthy habits will help your body move past its craving for nicotine. · Be prepared to keep trying. Most people are not successful the first few times they try to quit. Do not get mad at yourself if you smoke again. Make a list of things you learned and think about when you want to try again, such as next week, next month, or next year. Where can you learn more? Go to http://emilia-johnny.info/. Enter K604 in the search box to learn more about \"Stopping Smoking: Care Instructions. \"  Current as of: November 29, 2017  Content Version: 11.7  © 2724-6445 [employer]. Care instructions adapted under license by [employer] (which disclaims liability or warranty for this information). If you have questions about a medical condition or this instruction, always ask your healthcare professional. [place] [address] any warranty or liability for your use of this information.        Insomnia: Care Instructions  Your Care Instructions    Insomnia is the inability to sleep well. It is a common problem for most people at some time. Insomnia may make it hard for you to get to sleep, stay asleep, or sleep as long as you need to. This can make you tired and grouchy during the day. It can also make you forgetful, less effective at work, and unhappy. Insomnia can be caused by conditions such as depression or anxiety. Pain can also affect your ability to sleep. When these problems are solved, the insomnia usually clears up. But sometimes bad sleep habits can cause insomnia. If insomnia is affecting your work or your enjoyment of life, you can take steps to improve your sleep. Follow-up care is a key part of your treatment and safety. Be sure to make and go to all appointments, and call your doctor if you are having problems. It's also a good idea to know your test results and keep a list of the medicines you take. How can you care for yourself at home? What to avoid  · Do not have drinks with caffeine, such as coffee or black tea, for 8 hours before bed. · Do not smoke or use other types of tobacco near bedtime. Nicotine is a stimulant and can keep you awake. · Avoid drinking alcohol late in the evening, because it can cause you to wake in the middle of the night. · Do not eat a big meal close to bedtime. If you are hungry, eat a light snack. · Do not drink a lot of water close to bedtime, because the need to urinate may wake you up during the night. · Do not read or watch TV in bed. Use the bed only for sleeping and sexual activity. What to try  · Go to bed at the same time every night, and wake up at the same time every morning. Do not take naps during the day. · Keep your bedroom quiet, dark, and cool. · Sleep on a comfortable pillow and mattress. · If watching the clock makes you anxious, turn it facing away from you so you cannot see the time. · If you worry when you lie down, start a worry book.  Well before bedtime, write down your worries, and then set the book and your concerns aside. · Try meditation or other relaxation techniques before you go to bed. · If you cannot fall asleep, get up and go to another room until you feel sleepy. Do something relaxing. Repeat your bedtime routine before you go to bed again. · Make your house quiet and calm about an hour before bedtime. Turn down the lights, turn off the TV, log off the computer, and turn down the volume on music. This can help you relax after a busy day. When should you call for help? Watch closely for changes in your health, and be sure to contact your doctor if:    · Your efforts to improve your sleep do not work.     · Your insomnia gets worse.     · You have been feeling down, depressed, or hopeless or have lost interest in things that you usually enjoy. Where can you learn more? Go to http://emilia-johnny.info/. Enter P513 in the search box to learn more about \"Insomnia: Care Instructions. \"  Current as of: October 10, 2017  Content Version: 11.7  © 9271-0222 [employer]. Care instructions adapted under license by [employer] (which disclaims liability or warranty for this information). If you have questions about a medical condition or this instruction, always ask your healthcare professional. [place] [address] any warranty or liability for your use of this information. Follow-up Disposition:  Return in about 1 year (around 8/22/2019). I have reviewed with the patient details of the assessment and plan and all questions were answered. Relevent patient education was performed. The most recent lab findings were reviewed with the patient. An After Visit Summary was printed and given to the patient.

## 2018-08-22 NOTE — MR AVS SNAPSHOT
[facility] 
 
 
 [place] [address] [address] 
[phone] Patient: Luis M Adam MRN: [id number] SPF:[id number]/92/2252 Visit Information Date & Time Provider Department Dept. Phone Encounter #  
 8/22/2018  2:00 PM Malorie Shepherd, [address] [id number] Follow-up Instructions Return in about 1 year (around 8/22/2019). Your Appointments 8/22/2018  2:00 PM  
ROUTINE CARE with Malorie Shepherd MD  
[address] [place] [address] [facility] Appt Note: 6 month follow up on seizures and sleep; appt confirmed [place] [address] [place] [address]  
[phone]  
  
   
 [place]  
  
    
 12/4/2018 11:40 AM  
Follow Up with Abigail Garcia MD  
[facility] Appt Note: Follow up seizure,lsw,06/12/18  
 [address] [place] [address], 
[address] [place] [address], [address] [address]  
[address], [address] [place] [address], [address] [facility] [address] [facility] [address] [address]  
  
    
 12/5/2018  9:00 AM  
New Patient with Gretta Harding MD  
[facility] Appt Note: [place] [address]  
[address] [place] [id number] Upcoming Health Maintenance Date Due Influenza Age 5 to Adult 8/1/2018 DTaP/Tdap/Td series (2 - Td) 2/21/2028 Allergies as of 8/22/2018  Review Complete On: 8/22/2018 By: Chun Estrella LPN Severity Noted Reaction Type Reactions Aspirin  06/19/2011    Hives Current Immunizations  Reviewed on 2/21/2018 Name Date Pneumococcal Polysaccharide (PPSV-23) 2/21/2018 TD Vaccine 6/19/2011 11:16 PM  
 Tdap 2/21/2018 Not reviewed this visit You Were Diagnosed With   
  
 Codes Comments [facility]iz[facility] ([facility]    -  Primary ICD-10-CM: C19.927 ICD-9-CM: 345.90 Other forms of systemic lupus erythematosus, unspecified organ involvement status ([facility]     ICD-10-CM: M32.8 ICD-9-CM: 710.0 Vitals BP Pulse Temp Resp Height(growth percentile) Weight(growth percentile) 112/71 (BP 1 Location: Left arm, BP Patient Position: Sitting) 85 98.6 °F (37 °C) (Oral) 18 5' 8\" (1.727 m) 164 lb 8 oz (74.6 kg) SpO2 BMI Smoking Status 97% 25.01 kg/m2 Current Some Day Smoker Vitals History BMI and BSA Data Body Mass Index Body Surface Area 25.01 kg/m 2 1.89 m 2 Preferred Pharmacy Pharmacy Name Phone [facility] [address] [address] [facility] [phone] Your Updated Medication List  
  
   
This list is accurate as of 8/22/18  1:48 PM.  Always use your most recent med list.  
  
  
  
  
 levETIRAcetam 750 mg tablet Commonly known as:  KEPPRA Take 1 Tab by mouth two (2) times a day. Follow-up Instructions Return in about 1 year (around 8/22/2019). Patient Instructions Stopping Smoking: Care Instructions Your Care Instructions Cigarette smokers crave the nicotine in cigarettes. Giving it up is much harder than simply changing a habit. Your body has to stop craving the nicotine. It is hard to quit, but you can do it. There are many tools that people use to quit smoking. You may find that combining tools works best for you. There are several steps to quitting. First you get ready to quit. Then you get support to help you. After that, you learn new skills and behaviors to become a nonsmoker. For many people, a necessary step is getting and using medicine. Your doctor will help you set up the plan that best meets your needs. You may want to attend a smoking cessation program to help you quit smoking. When you choose a program, look for one that has proven success.  Ask your doctor for ideas. You will greatly increase your chances of success if you take medicine as well as get counseling or join a cessation program. 
Some of the changes you feel when you first quit tobacco are uncomfortable. Your body will miss the nicotine at first, and you may feel short-tempered and grumpy. You may have trouble sleeping or concentrating. Medicine can help you deal with these symptoms. You may struggle with changing your smoking habits and rituals. The last step is the tricky one: Be prepared for the smoking urge to continue for a time. This is a lot to deal with, but keep at it. You will feel better. Follow-up care is a key part of your treatment and safety. Be sure to make and go to all appointments, and call your doctor if you are having problems. It's also a good idea to know your test results and keep a list of the medicines you take. How can you care for yourself at home? · Ask your family, friends, and coworkers for support. You have a better chance of quitting if you have help and support. · Join a support group, such as Nicotine Anonymous, for people who are trying to quit smoking. · Consider signing up for a smoking cessation program, such as the American Lung Association's Freedom from Smoking program. 
· Get text messaging support. Go to the website at www.smokefree. gov to sign up for the [facility] program. 
· Set a quit date. Pick your date carefully so that it is not right in the middle of a big deadline or stressful time. Once you quit, do not even take a puff. Get rid of all ashtrays and lighters after your last cigarette. Clean your house and your clothes so that they do not smell of smoke. · Learn how to be a nonsmoker. Think about ways you can avoid those things that make you reach for a cigarette. ¨ Avoid situations that put you at greatest risk for smoking. For some people, it is hard to have a drink with friends without smoking.  For others, they might skip a coffee break with coworkers who smoke. ¨ Change your daily routine. Take a different route to work or eat a meal in a different place. · Cut down on stress. Calm yourself or release tension by doing an activity you enjoy, such as reading a book, taking a hot bath, or gardening. · Talk to your doctor or pharmacist about nicotine replacement therapy, which replaces the nicotine in your body. You still get nicotine but you do not use tobacco. Nicotine replacement products help you slowly reduce the amount of nicotine you need. These products come in several forms, many of them available over-the-counter: ¨ Nicotine patches ¨ Nicotine gum and lozenges ¨ Nicotine inhaler · Ask your doctor about bupropion (Wellbutrin) or varenicline (Chantix), which are prescription medicines. They do not contain nicotine. They help you by reducing withdrawal symptoms, such as stress and anxiety. · Some people find hypnosis, acupuncture, and massage helpful for ending the smoking habit. · Eat a healthy diet and get regular exercise. Having healthy habits will help your body move past its craving for nicotine. · Be prepared to keep trying. Most people are not successful the first few times they try to quit. Do not get mad at yourself if you smoke again. Make a list of things you learned and think about when you want to try again, such as next week, next month, or next year. Where can you learn more? Go to http://emilia-johnny.info/. Enter V928 in the search box to learn more about \"Stopping Smoking: Care Instructions. \" Current as of: November 29, 2017 Content Version: 11.7 © 3882-1326 SalesFloor.it. Care instructions adapted under license by [employer] (which disclaims liability or warranty for this information).  If you have questions about a medical condition or this instruction, always ask your healthcare professional. [employer] disclaims any warranty or liability for your use of this information. Insomnia: Care Instructions Your Care Instructions Insomnia is the inability to sleep well. It is a common problem for most people at some time. Insomnia may make it hard for you to get to sleep, stay asleep, or sleep as long as you need to. This can make you tired and grouchy during the day. It can also make you forgetful, less effective at work, and unhappy. Insomnia can be caused by conditions such as depression or anxiety. Pain can also affect your ability to sleep. When these problems are solved, the insomnia usually clears up. But sometimes bad sleep habits can cause insomnia. If insomnia is affecting your work or your enjoyment of life, you can take steps to improve your sleep. Follow-up care is a key part of your treatment and safety. Be sure to make and go to all appointments, and call your doctor if you are having problems. It's also a good idea to know your test results and keep a list of the medicines you take. How can you care for yourself at home? What to avoid · Do not have drinks with caffeine, such as coffee or black tea, for 8 hours before bed. · Do not smoke or use other types of tobacco near bedtime. Nicotine is a stimulant and can keep you awake. · Avoid drinking alcohol late in the evening, because it can cause you to wake in the middle of the night. · Do not eat a big meal close to bedtime. If you are hungry, eat a light snack. · Do not drink a lot of water close to bedtime, because the need to urinate may wake you up during the night. · Do not read or watch TV in bed. Use the bed only for sleeping and sexual activity. What to try · Go to bed at the same time every night, and wake up at the same time every morning. Do not take naps during the day. · Keep your bedroom quiet, dark, and cool. · Sleep on a comfortable pillow and mattress. · If watching the clock makes you anxious, turn it facing away from you so you cannot see the time. · If you worry when you lie down, start a worry book. Well before bedtime, write down your worries, and then set the book and your concerns aside. · Try meditation or other relaxation techniques before you go to bed. · If you cannot fall asleep, get up and go to another room until you feel sleepy. Do something relaxing. Repeat your bedtime routine before you go to bed again. · Make your house quiet and calm about an hour before bedtime. Turn down the lights, turn off the TV, log off the computer, and turn down the volume on music. This can help you relax after a busy day. When should you call for help? Watch closely for changes in your health, and be sure to contact your doctor if: 
  · Your efforts to improve your sleep do not work.  
  · Your insomnia gets worse.  
  · You have been feeling down, depressed, or hopeless or have lost interest in things that you usually enjoy. Where can you learn more? Go to http://emilia-johnny.info/. Enter P513 in the search box to learn more about \"Insomnia: Care Instructions. \" Current as of: October 10, 2017 Content Version: 11.7 © 6516-2497 BabyWatch[employer] Incorporated. Care instructions adapted under license by [employer] (which disclaims liability or warranty for this information). If you have questions about a medical condition or this instruction, always ask your healthcare professional. [place] [address] any warranty or liability for your use of this information. Introducing [facility]! Shekhar Dalton introduces [facility] patient portal. Now you can access parts of your medical record, email your doctor's office, and request medication refills online. 1. In your internet browser, go to [email or web address] 2. Click on the First Time User? Click Here link in the Sign In box.  You will see the New Member Sign Up page. 3. Enter your [employer] Access Code exactly as it appears below. You will not need to use this code after youve completed the sign-up process. If you do not sign up before the expiration date, you must request a new code. · [employer] Access Code: [email or web address] Expires: 9/10/2018 11:53 AM 
 
4. Enter the last four digits of your Social Security Number (xxxx) and Date of Birth (mm/dd/yyyy) as indicated and click Submit. You will be taken to the next sign-up page. 5. Create a [employer] ID. This will be your [employer] login ID and cannot be changed, so think of one that is secure and easy to remember. 6. Create a [employer] password. You can change your password at any time. 7. Enter your Password Reset Question and Answer. This can be used at a later time if you forget your password. 8. Enter your e-mail address. You will receive e-mail notification when new information is available in [address]. 9. Click Sign Up. You can now view and download portions of your medical record. 10. Click the Download Summary menu link to download a portable copy of your medical information. If you have questions, please visit the Frequently Asked Questions section of the [employer] website. Remember, [employer] is NOT to be used for urgent needs. For medical emergencies, dial 911. Now available from your iPhone and Android! Please provide this summary of care documentation to your next provider. Your primary care clinician is listed as Eddie Dougherty. If you have any questions after today's visit, please call [phone].

## 2020-01-22 ENCOUNTER — OFFICE VISIT (OUTPATIENT)
Dept: INTERNAL MEDICINE CLINIC | Age: 35
End: 2020-01-22

## 2020-01-22 VITALS
HEART RATE: 61 BPM | OXYGEN SATURATION: 100 % | WEIGHT: 187.1 LBS | BODY MASS INDEX: 28.36 KG/M2 | RESPIRATION RATE: 18 BRPM | TEMPERATURE: 97.2 F | DIASTOLIC BLOOD PRESSURE: 88 MMHG | HEIGHT: 68 IN | SYSTOLIC BLOOD PRESSURE: 124 MMHG

## 2020-01-22 DIAGNOSIS — F17.200 SMOKER: ICD-10-CM

## 2020-01-22 DIAGNOSIS — L30.9 ECZEMA, UNSPECIFIED TYPE: ICD-10-CM

## 2020-01-22 DIAGNOSIS — M32.9 SLE (SYSTEMIC LUPUS ERYTHEMATOSUS RELATED SYNDROME) (HCC): ICD-10-CM

## 2020-01-22 DIAGNOSIS — Z11.3 SCREENING EXAMINATION FOR STD (SEXUALLY TRANSMITTED DISEASE): ICD-10-CM

## 2020-01-22 DIAGNOSIS — G40.909 SEIZURE DISORDER (HCC): Primary | ICD-10-CM

## 2020-01-22 DIAGNOSIS — R56.9 SEIZURE (HCC): ICD-10-CM

## 2020-01-22 RX ORDER — OLANZAPINE 20 MG/1
20 TABLET ORAL
COMMUNITY
Start: 2019-12-28

## 2020-01-22 RX ORDER — PAROXETINE HYDROCHLORIDE 40 MG/1
TABLET, FILM COATED ORAL
COMMUNITY
Start: 2020-01-21

## 2020-01-22 RX ORDER — TRIAMCINOLONE ACETONIDE 1 MG/G
OINTMENT TOPICAL 2 TIMES DAILY
Qty: 60 G | Refills: 0 | Status: SHIPPED | OUTPATIENT
Start: 2020-01-22

## 2020-01-22 RX ORDER — LEVETIRACETAM 750 MG/1
750 TABLET ORAL 2 TIMES DAILY
Qty: 60 TAB | Refills: 5 | Status: SHIPPED | OUTPATIENT
Start: 2020-01-22 | End: 2021-01-11

## 2020-01-22 NOTE — PATIENT INSTRUCTIONS
Atopic Dermatitis: Care Instructions Your Care Instructions Atopic dermatitis (also called eczema) is a skin problem that causes intense itching and a red, raised rash. In severe cases, the rash develops clear fluidfilled blisters. The rash is not contagious. People with this condition seem to have very sensitive immune systems that are likely to react to things that cause allergies. The immune system is the body's way of fighting infection. There is no cure for atopic dermatitis, but you may be able to control it with care at home. Follow-up care is a key part of your treatment and safety. Be sure to make and go to all appointments, and call your doctor if you are having problems. It's also a good idea to know your test results and keep a list of the medicines you take. How can you care for yourself at home? · Use moisturizer at least twice a day. · If your doctor prescribes a cream, use it as directed. If your doctor prescribes other medicine, take it exactly as directed. · Wash the affected area with water only. Soap can make dryness and itching worse. Pat dry. · Apply a moisturizer after bathing. Use a cream such as Lubriderm, Moisturel, or Cetaphil that does not irritate the skin or cause a rash. Apply the cream while your skin is still damp after lightly drying with a towel. · Use cold, wet cloths to reduce itching. · Keep cool, and stay out of the sun. · If itching affects your normal activities, an over-the-counter antihistamine, such as diphenhydramine (Benadryl) or loratadine (Claritin) may help. Read and follow all instructions on the label. When should you call for help? Call your doctor now or seek immediate medical care if: 
  · Your rash gets worse and you have a fever.  
  · You have new blisters or bruises, or the rash spreads and looks like a sunburn.  
  · You have signs of infection, such as: 
? Increased pain, swelling, warmth, or redness. ? Red streaks leading from the rash. ? Pus draining from the rash. ? A fever.  
  · You have crusting or oozing sores.  
  · You have joint aches or body aches along with your rash.  
 Watch closely for changes in your health, and be sure to contact your doctor if: 
  · Your rash does not clear up after 2 to 3 weeks of home treatment.  
  · Itching interferes with your sleep or daily activities. Where can you learn more? Go to http://emilia-johnny.info/. Enter E371 in the search box to learn more about \"Atopic Dermatitis: Care Instructions. \" Current as of: April 1, 2019 Content Version: 12.2 © 3482-9921 CREAT. Care instructions adapted under license by [employer]igo [employer]othing (which disclaims liability or warranty for this information). If you have questions about a medical condition or this instruction, always ask your healthcare professional. [address] any warranty or liability for your use of this information.

## 2020-01-22 NOTE — PROGRESS NOTES
Subjective: (As above and below)     Chief Complaint   Patient presents with    Medication Refill     Katherin Hewitt is a 27y.o. year old male who presents for     Seizure medication refill: has f/u w/ neurology 3/2020. He reports being out of keppra x 1 month, reports last seizure 1 year ago. He does not drive    Depression: followed by psychiatry, reports doing well    Eczema: \"patches\" all over- is not using anything otc    PMX: SLE (per chart review was tx w/ MTX in the past by previous neuro but never followed up w/ rheum) endorses joint pain  Due for eye exam    Smoking: not ready to quit        Reviewed PmHx, RxHx, FmHx, SocHx, AllgHx and updated in chart. Family History   Problem Relation Age of Onset    Seizures Mother     Hypertension Mother     Asthma Mother        Past Medical History:   Diagnosis Date    Neurological disorder     seizures    [facility] ([facility]       Social History     Socioeconomic History    Marital status: SINGLE     Spouse name: Not on file    Number of children: Not on file    Years of education: Not on file    Highest education level: Not on file   Tobacco Use    Smoking status: Current Some Day Smoker     Packs/day: 1.00    Smokeless tobacco: Never Used   Substance and Sexual Activity    Alcohol use: No    Drug use: Yes     Types: Marijuana     Comment: occasion    Sexual activity: Never   Social History Narrative    ** Merged History Encounter **               Current Outpatient Medications   Medication Sig    OLANZapine (ZYPREXA) 20 mg tablet     PARoxetine (PAXIL) 40 mg tablet     levETIRAcetam (KEPPRA) 750 mg tablet Take 1 Tab by mouth two (2) times a day.  triamcinolone acetonide (KENALOG) 0.1 % ointment Apply  to affected area two (2) times a day. use thin layer     No current facility-administered medications for this visit. Review of Systems:   Constitutional:    Negative for fever and chills, negative diaphoresis.    HEENT: Negative for neck pain and stiffness. Eyes:                  Negative for visual disturbance, itching, redness or discharge. Respiratory:        Negative for cough and shortness of breath. Cardiovascular:  Negative for chest pain and palpitations. Gastrointestinal: Negative for nausea, vomiting, abdominal pain, diarrhea or constipation. Genitourinary:     Negative for dysuria and frequency. Musculoskeletal: Negative for falls, tenderness and swelling. Skin:                    Negative for rash, masses or lesions. Neurological:       Negative for dizzyness, seizure, loss of consciousness, weakness and numbness.      Objective:     Vitals:    01/22/20 0902 01/22/20 0917   BP: (!) 127/94 124/88   Pulse: 61    Resp: 18    Temp: 97.2 °F (36.2 °C)    TempSrc: Oral    SpO2: 100%    Weight: 187 lb 1.6 oz (84.9 kg)    Height: 5' 8\" (1.727 m)        Results for orders placed or performed in visit on 02/21/18   HIV 1/2 AG/AB, 4TH GENERATION,W RFLX CONFIRM   Result Value Ref Range    HIV SCREEN 4TH GENERATION WRFX Non Reactive Non Reactive   T PALLIDUM SCREEN W/REFLEX   Result Value Ref Range    T PALLIDUM AB Positive (A) Negative   CHLAMYDIA/GC PCR   Result Value Ref Range    Chlamydia trachomatis, TERRY Negative Negative    Neisseria gonorrhoeae, TERRY Negative Negative   HEPATITIS C AB   Result Value Ref Range    Hep C Virus Ab <0.1 0.0 - 0.9 s/co ratio   LIPID PANEL   Result Value Ref Range    Cholesterol, total 126 100 - 199 mg/dL    Triglyceride 101 0 - 149 mg/dL    HDL Cholesterol 65 >39 mg/dL    VLDL, calculated 20 5 - 40 mg/dL    LDL, calculated 41 0 - 99 mg/dL   RPR   Result Value Ref Range    RPR Non Reactive Non Reactive   T PALLIDUM IMMUNOBLOT   Result Value Ref Range    T. pallidum by Immunoblot Positive (A) Negative   CVD REPORT   Result Value Ref Range    INTERPRETATION Note          Physical Examination: General appearance - alert, well appearing, and in no distress  Mental status - alert, oriented to person, place, and time  Ears - bilateral TM's and external ear canals normal  Chest - clear to auscultation, no wheezes, rales or rhonchi, symmetric air entry  Heart - normal rate, regular rhythm, normal S1, S2, no murmurs, rubs, clicks or gallops  Extremities - no pedal edema noted      Assessment/ Plan:     Follow-up and Dispositions    · Return in about 1 year (around 1/22/2021), or if symptoms worsen or fail to improve. 1. [facility] ([facility]    - LIPID PANEL    2. SLE (systemic lupus erythematosus related syndrome) ([facility])  - REFERRAL TO RHEUMATOLOGY  - METABOLIC PANEL, COMPREHENSIVE  - CBC W/O DIFF    3. Smoker  Discussed smoking cessation    4. Eczema, unspecified type  Discussed skin care  - triamcinolone acetonide (KENALOG) 0.1 % ointment; Apply  to affected area two (2) times a day. use thin layer  Dispense: 60 g; Refill: 0    5. Screening examination for STD (sexually transmitted disease)  Routine, asymptomatic  - CHLAMYDIA/GC PCR  - N GONORRHOEA AMPLIFICATION  - HIV 1/2 AG/AB, 4TH GENERATION,W RFLX CONFIRM  - T PALLIDUM SCREEN W/REFLEX  - T VAGINALIS AMPLIFICATION    6. Seizure ([facility]  Will f/u w/ neuro as planned  - levETIRAcetam (KEPPRA) 750 mg tablet; Take 1 Tab by mouth two (2) times a day. Dispense: 60 Tab; Refill: 5        I have discussed the diagnosis with the patient and the intended plan as seen in the above orders. The patient has received an after-visit summary and questions were answered concerning future plans. Pt conveyed understanding of plan. Medication Side Effects and Warnings were discussed with patient: yes  Patient Labs were reviewed: yes  Patient Past Records were reviewed:  yes    Miguel Angel Dutton.  Cesar Mckay NP

## 2020-01-22 NOTE — PROGRESS NOTES
Pt here for   Chief Complaint   Patient presents with    Medication Refill     Keppra     1. Have you been to the ER, urgent care clinic since your last visit? Hospitalized since your last visit? No    2. Have you seen or consulted any other health care providers outside of the [address] [place] [address] since your last visit? Include any pap smears or colon screening.  No     Pt denies pain at this time    3 most recent PHQ Screens 1/22/2020   PHQ Not Done Active Diagnosis of Depression or Bipolar Disorder   Little interest or pleasure in doing things Several days   Feeling down, depressed, irritable, or hopeless Several days   Total Score PHQ 2 2   Trouble falling or staying asleep, or sleeping too much -   Feeling tired or having little energy -   Poor appetite, weight loss, or overeating -   Feeling bad about yourself - or that you are a failure or have let yourself or your family down -   Trouble concentrating on things such as school, work, reading, or watching TV -   Moving or speaking so slowly that other people could have noticed; or the opposite being so fidgety that others notice -   Thoughts of being better off dead, or hurting yourself in some way -   PHQ 9 Score -   How difficult have these problems made it for you to do your work, take care of your home and get along with others -

## 2020-03-03 ENCOUNTER — OFFICE VISIT (OUTPATIENT)
Dept: NEUROLOGY | Age: 35
End: 2020-03-03

## 2020-03-03 VITALS
DIASTOLIC BLOOD PRESSURE: 80 MMHG | HEART RATE: 81 BPM | SYSTOLIC BLOOD PRESSURE: 122 MMHG | BODY MASS INDEX: 28.04 KG/M2 | OXYGEN SATURATION: 98 % | HEIGHT: 68 IN | WEIGHT: 185 LBS

## 2020-03-03 DIAGNOSIS — R56.9 SEIZURE (HCC): Primary | ICD-10-CM

## 2020-03-03 NOTE — LETTER
3/3/20 Patient: Angelica Queen YOB: 1985 Date of Visit: 3/3/2020 Nidhi Connelly MD 
[facility] [address] [place] [phone] VIA In Basket Dear Nidhi Connelly MD, Thank you for referring Mr. Angelica Queen to [address] [place] [address] for evaluation. My notes for this consultation are attached. If you have questions, please do not hesitate to call me. I look forward to following your patient along with you. Sincerely, Dominguez Dai MD

## 2020-03-03 NOTE — PATIENT INSTRUCTIONS

## 2020-03-03 NOTE — PROGRESS NOTES
Neurology follow-up note    03/03/20    Chief Complaint   Patient presents with    Follow-up     no seizures since last visit       Shailesh Baldwin is a 27 y.o. male who presented to the neurology office for seizures. The patient seizures started at the age of 14 and he states that the seizures could be brought in because of stress and if he is not eating. According to his fiancée, the patient starts staring and sometimes have jerking of his whole body. The patient was having one seizure every month and the patient was taking Keppra 750 mg p.o. twice daily and had a seizure in October 2019. He ran out of his medication and was not taking them. In the past he has had seizures where he stares off with generalized shaking. His eyes start to flutter and then rolled up. He is confused the whole day. No bladder or bowel incontinence. Current Outpatient Medications   Medication Sig    OLANZapine (ZYPREXA) 20 mg tablet     PARoxetine (PAXIL) 40 mg tablet     levETIRAcetam (KEPPRA) 750 mg tablet Take 1 Tab by mouth two (2) times a day.  triamcinolone acetonide (KENALOG) 0.1 % ointment Apply  to affected area two (2) times a day. use thin layer     No current facility-administered medications for this visit. Past Medical History:   Diagnosis Date    Neurological disorder     seizures    Seizures ([address]      History reviewed. No pertinent surgical history.   Family History   Problem Relation Age of Onset    Seizures Mother     Hypertension Mother     Asthma Mother      Social History     Tobacco Use    Smoking status: Current Some Day Smoker     Packs/day: 1.00    Smokeless tobacco: Never Used   Substance Use Topics    Alcohol use: No    Drug use: Yes     Types: Marijuana     Comment: occasion       REVIEW OF SYSTEMS:   A ten system review of constitutional, cardiovascular, respiratory, musculoskeletal, endocrine, skin, SHEENT, genitourinary, psychiatric and neurologic systems was obtained and is unremarkable except seizures    EXAMINATION:   Visit Vitals  Ht 5' 8\" (1.727 m)   Wt 185 lb (83.9 kg)   BMI 28.13 kg/m²        General:   General appearance: Pt is in no acute distress   Distal pulses are preserved    Neurological Examination:   Mental Status: AAO x3. Speech is fluent. Follows commands, has normal fund of knowledge, attention, short term recall, comprehension and insight. Cranial Nerves: Visual fields are full. PERRL, Extraocular movements are full. Facial sensation intact. Facial movement intact. Hearing intact to conversation. Palate elevates symmetrically. Shoulder shrug symmetric. Tongue midline. Motor: Strength is 5/5 in all 4 ext. Sensation: Normal to light touch    Coordination/Cerebellar: Intact to finger-nose-finger     Skin: No significant bruising or lacerations.     Laboratory review:   Results for orders placed or performed in visit on 02/21/18   HIV 1/2 AG/AB, 4TH GENERATION,W RFLX CONFIRM   Result Value Ref Range    HIV SCREEN 4TH GENERATION WRFX Non Reactive Non Reactive   T PALLIDUM SCREEN W/REFLEX   Result Value Ref Range    T PALLIDUM AB Positive (A) Negative   CHLAMYDIA/GC PCR   Result Value Ref Range    Chlamydia trachomatis, TERRY Negative Negative    Neisseria gonorrhoeae, TERRY Negative Negative   HEPATITIS C AB   Result Value Ref Range    Hep C Virus Ab <0.1 0.0 - 0.9 s/co ratio   LIPID PANEL   Result Value Ref Range    Cholesterol, total 126 100 - 199 mg/dL    Triglyceride 101 0 - 149 mg/dL    HDL Cholesterol 65 >39 mg/dL    VLDL, calculated 20 5 - 40 mg/dL    LDL, calculated 41 0 - 99 mg/dL   RPR   Result Value Ref Range    RPR Non Reactive Non Reactive   T PALLIDUM IMMUNOBLOT   Result Value Ref Range    T. pallidum by Immunoblot Positive (A) Negative   CVD REPORT   Result Value Ref Range    INTERPRETATION Note        Imaging review:  7/25/2016  MRI brain with and without contrast  Diminished marrow signal in the upper cervical spine may be within normal variation due to red marrow replacement. Clinical correlation is recommended    Documentation review:  None    Assessment/Plan:   Yakov Spivey is a 27 y.o. male who presented to the neurology office for management of seizures. Patient has been having seizures for a number of years and his last seizure was in October 2019. He ran out of his medications at that time. Overall his seizures are well controlled on Keppra 750 mg p.o. twice daily. We will continue the same. Follow-up in 6 months    3 most recent PHQ Screens 3/3/2020   PHQ Not Done -   Little interest or pleasure in doing things Not at all   Feeling down, depressed, irritable, or hopeless Not at all   Total Score PHQ 2 0   Trouble falling or staying asleep, or sleeping too much -   Feeling tired or having little energy -   Poor appetite, weight loss, or overeating -   Feeling bad about yourself - or that you are a failure or have let yourself or your family down -   Trouble concentrating on things such as school, work, reading, or watching TV -   Moving or speaking so slowly that other people could have noticed; or the opposite being so fidgety that others notice -   Thoughts of being better off dead, or hurting yourself in some way -   PHQ 9 Score -   How difficult have these problems made it for you to do your work, take care of your home and get along with others -     Primary care to address possible depression if PHQ-9 score is more than 9. ICD-10-CM ICD-9-CM    1. Seizure ([facility] R56.9 780.39       Over 25 minutes was spent with the patient and family of which > 50% of the visit was spent counseling on diagnosis, management, and treatment of the diagnosis      Lary Sung MD  Neurologist    CC: Thomas Hitchcock MD  Fax: [phone]    This note was created using voice recognition software. Despite editing, there may be syntax errors.

## 2021-01-10 DIAGNOSIS — R56.9 SEIZURE (HCC): ICD-10-CM

## 2021-01-11 RX ORDER — LEVETIRACETAM 750 MG/1
TABLET ORAL
Qty: 60 TAB | Refills: 1 | Status: SHIPPED | OUTPATIENT
Start: 2021-01-11 | End: 2021-07-20 | Stop reason: SDUPTHER

## 2021-07-20 ENCOUNTER — OFFICE VISIT (OUTPATIENT)
Dept: INTERNAL MEDICINE CLINIC | Age: 36
End: 2021-07-20
Payer: MEDICAID

## 2021-07-20 VITALS
WEIGHT: 184.2 LBS | SYSTOLIC BLOOD PRESSURE: 125 MMHG | RESPIRATION RATE: 19 BRPM | DIASTOLIC BLOOD PRESSURE: 79 MMHG | HEART RATE: 59 BPM | TEMPERATURE: 98.2 F | OXYGEN SATURATION: 100 % | BODY MASS INDEX: 27.92 KG/M2 | HEIGHT: 68 IN

## 2021-07-20 DIAGNOSIS — R56.9 SEIZURE (HCC): ICD-10-CM

## 2021-07-20 PROCEDURE — 99213 OFFICE O/P EST LOW 20 MIN: CPT | Performed by: INTERNAL MEDICINE

## 2021-07-20 RX ORDER — LEVETIRACETAM 750 MG/1
750 TABLET ORAL 2 TIMES DAILY
Qty: 60 TABLET | Refills: 2 | Status: SHIPPED | OUTPATIENT
Start: 2021-07-20

## 2021-07-20 NOTE — PROGRESS NOTES
Saumya Arenas is a 38 y.o. male and presents with ED Follow-up ([facility] 7/19/21 for vomitting ) and Medication Refill (seizure meds- neuro  is not my Dr. Alison Lozano)  . Subjective:    Last appt w me 2018    Pt was eval and d/c from [facility] ED yesterday for nausea and vomiting x 6 weeks. W/u negative. He has no complaints today. He has not seen his neurologist in 3 years      PMH-  Seizure d/o stable on keppra  SLE-was on MTX and prednisone given by neuro as per pt   -pt relays his sxs of SLE are myalgias  Migraine headaches-quiescent  Illiteracy      Review of Systems  Constitutional: negative for fevers, chills, anorexia and weight loss  Respiratory:  negative for cough, hemoptysis, dyspnea,wheezing  CV:   negative for chest pain, palpitations, lower extremity edema  GI:   negative for nausea, vomiting, diarrhea, abdominal pain,melena  Musculoskel: positive for myalgias, arthralgias, back pain, joint pain  Neurological:  positive for headaches, dizziness, vertigo, memory problems   Behavl/Psych: negative for feelings of anxiety, depression, mood changes    Past Medical History:   Diagnosis Date    Neurological disorder     seizures    [facility] ([facility]      History reviewed. No pertinent surgical history.   Social History     Socioeconomic History    Marital status: SINGLE     Spouse name: Not on file    Number of children: Not on file    Years of education: Not on file    Highest education level: Not on file   Tobacco Use    Smoking status: Current Some Day Smoker     Packs/day: 1.00    Smokeless tobacco: Never Used   Vaping Use    Vaping Use: Never used   Substance and Sexual Activity    Alcohol use: No    Drug use: Yes     Types: Marijuana     Comment: occasion    Sexual activity: Never   Social History Narrative    ** Merged History Encounter **          Social Determinants of Health     Financial Resource Strain:     Difficulty of Paying Living Expenses:    Food Insecurity:     Worried About Running Out of Food in the Last Year:    [address] in the Last Year:    Transportation Needs:     Lack of Transportation (Medical):  Lack of Transportation (Non-Medical):    Physical Activity:     Days of Exercise per Week:     Minutes of Exercise per Session:    Stress:     Feeling of Stress :    Social Connections:     Frequency of Communication with Friends and Family:     Frequency of Social Gatherings with Friends and Family:     Attends [employer] Services:     Active Member of Clubs or Organizations:     Attends Club or Organization Meetings:     Marital Status:      Family History   Problem Relation Age of Onset    Seizures Mother     Hypertension Mother     Asthma Mother      Current Outpatient Medications   Medication Sig Dispense Refill    levETIRAcetam (KEPPRA) 750 mg tablet TAKE ONE TABLET BY MOUTH TWICE A DAY 60 Tab 1    OLANZapine (ZYPREXA) 20 mg tablet Take 20 mg by mouth nightly.  triamcinolone acetonide (KENALOG) 0.1 % ointment Apply  to affected area two (2) times a day.  use thin layer 60 g 0    PARoxetine (PAXIL) 40 mg tablet  (Patient not taking: Reported on 7/20/2021)       Allergies   Allergen Reactions    Aspirin Hives       Objective:  Visit Vitals  /79 (BP 1 Location: Left upper arm, BP Patient Position: Sitting, BP Cuff Size: Adult)   Pulse (!) 59   Temp 98.2 °F (36.8 °C) (Temporal)   Resp 19   Ht 5' 8\" (1.727 m)   Wt 184 lb 3.2 oz (83.6 kg)   SpO2 100%   BMI 28.01 kg/m²     Physical Exam:   General appearance - alert, well appearing, and in no distress  Mental status - alert, oriented to person, place, and time  EYE-EOMI  ENT-ENT exam normal, no neck nodes or sinus tenderness  Mouth - mucous membranes moist, pharynx normal without lesions  Neck - supple, no significant adenopathy   Chest - clear to auscultation, no wheezes, rales or rhonchi, symmetric air entry   Heart - normal rate, regular rhythm, normal S1, S2  Abdomen - soft, nontender, nondistended, no Detail Level: Detailed masses or organomegaly  Ext-peripheral pulses normal, no pedal edema, no clubbing or cyanosis  Skin-Warm and dry. no hyperpigmentation, vitiligo, or suspicious lesions  Neuro -alert, oriented, normal speech, no focal findings or movement disorder noted      Results for orders placed or performed in visit on 02/21/18   HIV 1/2 AG/AB, 4TH GENERATION,W RFLX CONFIRM   Result Value Ref Range    HIV SCREEN 4TH GENERATION WRFX Non Reactive Non Reactive   T PALLIDUM SCREEN W/REFLEX   Result Value Ref Range    T PALLIDUM AB Positive (A) Negative   CHLAMYDIA/GC PCR   Result Value Ref Range    Chlamydia trachomatis, TERRY Negative Negative    Neisseria gonorrhoeae, TERRY Negative Negative   HEPATITIS C AB   Result Value Ref Range    Hep C Virus Ab <0.1 0.0 - 0.9 s/co ratio   LIPID PANEL   Result Value Ref Range    Cholesterol, total 126 100 - 199 mg/dL    Triglyceride 101 0 - 149 mg/dL    HDL Cholesterol 65 >39 mg/dL    VLDL, calculated 20 5 - 40 mg/dL    LDL, calculated 41 0 - 99 mg/dL   RPR   Result Value Ref Range    RPR Non Reactive Non Reactive   T PALLIDUM IMMUNOBLOT   Result Value Ref Range    T. pallidum by Immunoblot Positive (A) Negative   CVD REPORT   Result Value Ref Range    INTERPRETATION Note        Assessment/Plan:    ICD-10-CM ICD-9-CM    1. Seizure (HCC)  R56.9 780.39 levETIRAcetam (KEPPRA) 750 mg tablet   1. Seizure disorder ([address]  Refill keppra  F/u neuro    No orders of the defined types were placed in this encounter. There are no Patient Instructions on file for this visit. I have reviewed with the patient details of the assessment and plan and all questions were answered. Relevent patient education was performed. The most recent lab findings were reviewed with the patient. An After Visit Summary was printed and given to the patient. Quality 130: Documentation Of Current Medications In The Medical Record: Current Medications Documented

## 2021-07-20 NOTE — PROGRESS NOTES
Chief Complaint   Patient presents with    ED Follow-up     [facility] 7/19/[facility] for vomitting     Medication Refill     seizure meds- neuro  is not my Dr. Mike Thompson     1. Have you been to the ER, urgent care clinic since your last visit? Hospitalized since your last visit? Yes When: [facility] 7/19/21 for vomitting     2. Have you seen or consulted any other health care providers outside of the [address] [place] [address] since your last visit? Include any pap smears or colon screening.  No

## 2022-11-27 ENCOUNTER — HOSPITAL ENCOUNTER (EMERGENCY)
Age: 37
Discharge: HOME | End: 2022-11-27
Attending: EMERGENCY MEDICINE | Admitting: EMERGENCY MEDICINE
Payer: MEDICAID

## 2022-11-27 VITALS
RESPIRATION RATE: 18 BRPM | SYSTOLIC BLOOD PRESSURE: 122 MMHG | HEIGHT: 68 IN | DIASTOLIC BLOOD PRESSURE: 80 MMHG | WEIGHT: 185 LBS | OXYGEN SATURATION: 100 % | TEMPERATURE: 97.7 F | HEART RATE: 58 BPM | BODY MASS INDEX: 28.04 KG/M2

## 2022-11-27 DIAGNOSIS — R56.9 SEIZURE (HCC): Primary | ICD-10-CM

## 2022-11-27 LAB
ANION GAP SERPL CALC-SCNC: 3 MMOL/L (ref 5–15)
BUN SERPL-MCNC: 9 MG/DL (ref 6–20)
BUN/CREAT SERPL: 10 (ref 12–20)
CA-I BLD-MCNC: 9.4 MG/DL (ref 8.5–10.1)
CHLORIDE SERPL-SCNC: 107 MMOL/L (ref 97–108)
CO2 SERPL-SCNC: 28 MMOL/L (ref 21–32)
CREAT SERPL-MCNC: 0.86 MG/DL (ref 0.7–1.3)
ERYTHROCYTE [DISTWIDTH] IN BLOOD BY AUTOMATED COUNT: 15 % (ref 11.5–14.5)
GLUCOSE SERPL-MCNC: 77 MG/DL (ref 65–100)
HCT VFR BLD AUTO: 41.5 % (ref 36.6–50.3)
HGB BLD-MCNC: 13.4 G/DL (ref 12.1–17)
MCH RBC QN AUTO: 25 PG (ref 26–34)
MCHC RBC AUTO-ENTMCNC: 32.3 G/DL (ref 30–36.5)
MCV RBC AUTO: 77.3 FL (ref 80–99)
NRBC # BLD: 0 K/UL (ref 0–0.01)
NRBC BLD-RTO: 0 PER 100 WBC
PLATELET # BLD AUTO: 209 K/UL (ref 150–400)
PMV BLD AUTO: 11.1 FL (ref 8.9–12.9)
POTASSIUM SERPL-SCNC: 3.9 MMOL/L (ref 3.5–5.1)
RBC # BLD AUTO: 5.37 M/UL (ref 4.1–5.7)
SODIUM SERPL-SCNC: 138 MMOL/L (ref 136–145)
WBC # BLD AUTO: 7.6 K/UL (ref 4.1–11.1)

## 2022-11-27 PROCEDURE — 99284 EMERGENCY DEPT VISIT MOD MDM: CPT

## 2022-11-27 PROCEDURE — 74011250637 HC RX REV CODE- 250/637: Performed by: EMERGENCY MEDICINE

## 2022-11-27 PROCEDURE — 96375 TX/PRO/DX INJ NEW DRUG ADDON: CPT

## 2022-11-27 PROCEDURE — 85027 COMPLETE CBC AUTOMATED: CPT

## 2022-11-27 PROCEDURE — 74011250636 HC RX REV CODE- 250/636: Performed by: EMERGENCY MEDICINE

## 2022-11-27 PROCEDURE — 96374 THER/PROPH/DIAG INJ IV PUSH: CPT

## 2022-11-27 PROCEDURE — 93005 ELECTROCARDIOGRAM TRACING: CPT

## 2022-11-27 PROCEDURE — 36415 COLL VENOUS BLD VENIPUNCTURE: CPT

## 2022-11-27 PROCEDURE — 80048 BASIC METABOLIC PNL TOTAL CA: CPT

## 2022-11-27 RX ORDER — ACETAMINOPHEN 500 MG
1000 TABLET ORAL ONCE
Status: COMPLETED | OUTPATIENT
Start: 2022-11-27 | End: 2022-11-27

## 2022-11-27 RX ORDER — METOCLOPRAMIDE HYDROCHLORIDE 5 MG/ML
10 INJECTION INTRAMUSCULAR; INTRAVENOUS
Status: COMPLETED | OUTPATIENT
Start: 2022-11-27 | End: 2022-11-27

## 2022-11-27 RX ORDER — DIVALPROEX SODIUM 500 MG/1
500 TABLET, DELAYED RELEASE ORAL 2 TIMES DAILY
Qty: 60 TABLET | Refills: 0 | Status: SHIPPED | OUTPATIENT
Start: 2022-11-27 | End: 2022-12-27

## 2022-11-27 RX ORDER — DIPHENHYDRAMINE HYDROCHLORIDE 50 MG/ML
25 INJECTION, SOLUTION INTRAMUSCULAR; INTRAVENOUS ONCE
Status: COMPLETED | OUTPATIENT
Start: 2022-11-27 | End: 2022-11-27

## 2022-11-27 RX ORDER — DIVALPROEX SODIUM 500 MG/1
500 TABLET, DELAYED RELEASE ORAL
Status: COMPLETED | OUTPATIENT
Start: 2022-11-27 | End: 2022-11-27

## 2022-11-27 RX ORDER — DEXAMETHASONE SODIUM PHOSPHATE 4 MG/ML
10 INJECTION, SOLUTION INTRA-ARTICULAR; INTRALESIONAL; INTRAMUSCULAR; INTRAVENOUS; SOFT TISSUE ONCE
Status: COMPLETED | OUTPATIENT
Start: 2022-11-27 | End: 2022-11-27

## 2022-11-27 RX ADMIN — ACETAMINOPHEN 1000 MG: 500 TABLET ORAL at 12:57

## 2022-11-27 RX ADMIN — DIPHENHYDRAMINE HYDROCHLORIDE 25 MG: 50 INJECTION INTRAMUSCULAR; INTRAVENOUS at 12:58

## 2022-11-27 RX ADMIN — SODIUM CHLORIDE 1000 ML: 9 INJECTION, SOLUTION INTRAVENOUS at 12:59

## 2022-11-27 RX ADMIN — DIVALPROEX SODIUM 500 MG: 500 TABLET, DELAYED RELEASE ORAL at 12:59

## 2022-11-27 RX ADMIN — METOCLOPRAMIDE 10 MG: 5 INJECTION, SOLUTION INTRAMUSCULAR; INTRAVENOUS at 12:58

## 2022-11-27 RX ADMIN — DEXAMETHASONE SODIUM PHOSPHATE 10 MG: 4 INJECTION, SOLUTION INTRA-ARTICULAR; INTRALESIONAL; INTRAMUSCULAR; INTRAVENOUS; SOFT TISSUE at 12:58

## 2022-11-27 NOTE — ED PROVIDER NOTES
EMERGENCY DEPARTMENT HISTORY AND PHYSICAL EXAM      Date: 11/27/2022  Patient Name: Romero Berry    History of Presenting Illness     Chief Complaint   Patient presents with    Seizure       History Provided By: Patient    HPI: Romero Berry, 44 y.o. male with history of seizures who presents with seizure activity. Symptoms started prior to arrival while he was at [employer]. States that he had headache before started. This has happened to him before with his seizures. States that he currently has a headache that is bilateral, constant, moderate. States that he was shaking all over. Uncertain how long the episode lasted. Patient states he does take Keppra however has not been taking it because he does not like the way it makes him feel. He also has not had a doctor to refill it for him. Denies any fevers. No other symptoms. There are no other complaints, changes, or physical findings at this time. PCP: Alexandr Patel MD    Current Outpatient Medications   Medication Sig Dispense Refill    divalproex DR (Depakote) 500 mg tablet Take 1 Tablet by mouth two (2) times a day for 30 days. 60 Tablet 0    levETIRAcetam (KEPPRA) 750 mg tablet Take 1 Tablet by mouth two (2) times a day. 60 Tablet 2    OLANZapine (ZYPREXA) 20 mg tablet Take 20 mg by mouth nightly. PARoxetine (PAXIL) 40 mg tablet  (Patient not taking: Reported on 7/20/2021)      triamcinolone acetonide (KENALOG) 0.1 % ointment Apply  to affected area two (2) times a day. use thin layer 60 g 0       Past History   Past Medical History:  Past Medical History:   Diagnosis Date    Neurological disorder     seizures    Seizures ([address]         Past Surgical History:  No past surgical history on file.     Family History:  Family History   Problem Relation Age of Onset    Seizures Mother     Hypertension Mother     Asthma Mother        Social History:  Social History     Tobacco Use    Smoking status: Some Days     Packs/day: 1.00     Types: Cigarettes    Smokeless tobacco: Never   Vaping Use    Vaping Use: Never used   Substance Use Topics    Alcohol use: No    Drug use: Yes     Types: Marijuana     Comment: occasion       Allergies: Allergies   Allergen Reactions    Aspirin Hives        Review of Systems   Review of Systems   Constitutional:  Negative for fever. HENT:  Negative for congestion. Eyes:  Negative for visual disturbance. Respiratory:  Negative for shortness of breath. Cardiovascular:  Negative for chest pain. Gastrointestinal:  Negative for abdominal pain. Genitourinary:  Negative for dysuria. Musculoskeletal:  Negative for arthralgias. Skin:  Negative for rash. Neurological:  Positive for seizures and headaches. Physical Exam   Constitutional: Uncomfortable but nontoxic. Well-nourished. Skin: No rash. ENT: No rhinorrhea. No cough. Head is normocephalic and atraumatic. Eye: No proptosis or conjunctival injections. Respiratory: No apparent respiratory distress. Gastrointestinal: Nondistended. Musculoskeletal: No obvious bony deformities. Neuro: Cranial nerves II through XII grossly intact. Normal strength in extremities. Cardiac: Regular rate and rhythm. 2+ radial pulses. No murmurs.     Lab and Diagnostic Study Results   Labs -     Recent Results (from the past 12 hour(s))   METABOLIC PANEL, BASIC    Collection Time: 11/27/22 12:50 PM   Result Value Ref Range    Sodium 138 136 - 145 mmol/L    Potassium 3.9 3.5 - 5.1 mmol/L    Chloride 107 97 - 108 mmol/L    CO2 28 21 - 32 mmol/L    Anion gap 3 (L) 5 - 15 mmol/L    Glucose 77 65 - 100 mg/dL    BUN 9 6 - 20 mg/dL    Creatinine 0.86 0.70 - 1.30 mg/dL    BUN/Creatinine ratio 10 (L) 12 - 20      eGFR >60 >60 ml/min/1.73m2    Calcium 9.4 8.5 - 10.1 mg/dL   CBC W/O DIFF    Collection Time: 11/27/22 12:50 PM   Result Value Ref Range    WBC 7.6 4.1 - 11.1 K/uL    RBC 5.37 4.10 - 5.70 M/uL    HGB 13.4 12.1 - 17.0 g/dL    HCT 41.5 36.6 - 50.3 %    MCV 77.3 (L) 80.0 - 99.0 FL    MCH 25.0 (L) 26.0 - 34.0 PG    MCHC 32.3 30.0 - 36.5 g/dL    RDW 15.0 (H) 11.5 - 14.5 %    PLATELET 416 855 - 388 K/uL    MPV 11.1 8.9 - 12.9 FL    NRBC 0.0 0.0  WBC    ABSOLUTE NRBC 0.00 0.00 - 0.01 K/uL       Radiologic Studies -   [unfilled]  CT Results  (Last 48 hours)      None          CXR Results  (Last 48 hours)      None            Medical Decision Making and ED Course   - I am the first and primary provider for this patient AND AM THE PRIMARY PROVIDER OF RECORD. I reviewed the vital signs, available nursing notes, past medical history, past surgical history, family history and social history. - Initial assessment performed. The patients presenting problems have been discussed, and the staff are in agreement with the care plan formulated and outlined with them. I have encouraged them to ask questions as they arise throughout their visit. Vital Signs-Reviewed the patient's vital signs. Patient Vitals for the past 12 hrs:   Temp Pulse Resp BP SpO2   11/27/22 1326 -- (!) 54 15 122/80 100 %   11/27/22 1256 -- (!) 56 15 (!) 144/93 99 %   11/27/22 1231 97.7 °F (36.5 °C) 64 20 (!) 153/91 99 %       EKG interpretation: (Preliminary): EKG Interpreted by ED physician. Obtained 11/27/2022 at 1236. Read at 60-74-66-62. Normal sinus rhythm at rate of 61 bpm.  Normal VA interval, QRS duration, QTc interval.  No ST segment abnormalities. Normal axis. MDM  The differential diagnosis is seizure, syncope. Patient has not been taking his Keppra since it does not make him feel good. I will prescribe Depakote. I did give him a dose in the ED. He was also given Tylenol, Decadron, Benadryl, Reglan, and IV fluids in the ED for his headache. I believe headache is likely related to seizure. Feel no need for CT scan of the head. Discharged in good condition with prescription for Depakote. Recommended close follow-up with neurology and gave him information for this.     Disposition Disposition: Discharged    DISCHARGE PLAN:  1. Current Discharge Medication List        CONTINUE these medications which have NOT CHANGED    Details   levETIRAcetam (KEPPRA) 750 mg tablet Take 1 Tablet by mouth two (2) times a day. Qty: 60 Tablet, Refills: 2    Associated Diagnoses: Seizure ([address]      OLANZapine (ZYPREXA) 20 mg tablet Take 20 mg by mouth nightly. PARoxetine (PAXIL) 40 mg tablet       triamcinolone acetonide (KENALOG) 0.1 % ointment Apply  to affected area two (2) times a day. use thin layer  Qty: 60 g, Refills: 0    Associated Diagnoses: Eczema, unspecified type           2. Follow-up Information       Follow up With Specialties Details Why Contact Info    Tacos Quinonez MD Neurology Schedule an appointment as soon as possible for a visit  Neurology [address] [place] [address] [address] [place] [address]  [phone]            3. Return to ED if worse   4. Current Discharge Medication List        START taking these medications    Details   divalproex DR (Depakote) 500 mg tablet Take 1 Tablet by mouth two (2) times a day for 30 days. Qty: 60 Tablet, Refills: 0  Start date: 11/27/2022, End date: 12/27/2022              Diagnosis/Clinical Impression   Clinical Impression:   1. Seizure [facility])           Attestations:  Blaze Dey, DO    Please note that this dictation was completed with [employer], the computer voice recognition software. Quite often unanticipated grammatical, syntax, homophones, and other interpretive errors are inadvertently transcribed by the computer software. Please disregard these errors. Please excuse any errors that have escaped final proofreading. Thank you.

## 2022-11-29 LAB
ATRIAL RATE: 61 BPM
CALCULATED R AXIS, ECG10: 39 DEGREES
CALCULATED T AXIS, ECG11: 12 DEGREES
DIAGNOSIS, 93000: NORMAL
P-R INTERVAL, ECG05: 182 MS
Q-T INTERVAL, ECG07: 396 MS
QRS DURATION, ECG06: 90 MS
QTC CALCULATION (BEZET), ECG08: 398 MS
VENTRICULAR RATE, ECG03: 61 BPM